# Patient Record
Sex: MALE | Race: WHITE | NOT HISPANIC OR LATINO | Employment: OTHER | URBAN - METROPOLITAN AREA
[De-identification: names, ages, dates, MRNs, and addresses within clinical notes are randomized per-mention and may not be internally consistent; named-entity substitution may affect disease eponyms.]

---

## 2017-01-25 ENCOUNTER — HOSPITAL ENCOUNTER (OUTPATIENT)
Dept: ONCOLOGY | Facility: CLINIC | Age: 62
Discharge: HOME | End: 2017-01-25
Attending: INTERNAL MEDICINE | Admitting: INTERNAL MEDICINE

## 2017-04-25 ENCOUNTER — HOSPITAL ENCOUNTER (OUTPATIENT)
Dept: ONCOLOGY | Facility: CLINIC | Age: 62
Discharge: HOME | End: 2017-04-25
Attending: NURSE PRACTITIONER | Admitting: NURSE PRACTITIONER

## 2017-04-25 LAB
ALBUMIN SERPL-MCNC: 3.9 G/DL (ref 3.5–4.8)
ALBUMIN/GLOB SERPL: 1.3 {RATIO} (ref 1–1.7)
ALP SERPL-CCNC: 89 IU/L (ref 32–91)
ALT SERPL-CCNC: 27 IU/L (ref 17–63)
ANION GAP SERPL CALC-SCNC: 15 MMOL/L (ref 10–20)
AST SERPL-CCNC: 25 IU/L (ref 15–41)
BILIRUB SERPL-MCNC: 0.7 MG/DL (ref 0.3–1.2)
BUN SERPL-MCNC: 17 MG/DL (ref 8–20)
BUN/CREAT SERPL: 17 (ref 6.2–20.3)
CALCIUM SERPL-MCNC: 9.5 MG/DL (ref 8.9–10.3)
CHLORIDE SERPL-SCNC: 105 MMOL/L (ref 101–111)
CONV CO2: 24 MMOL/L (ref 22–32)
CONV TOTAL PROTEIN: 6.9 G/DL (ref 6.1–7.9)
CREAT UR-MCNC: 1 MG/DL (ref 0.7–1.2)
GLOBULIN UR ELPH-MCNC: 3 G/DL (ref 2.5–3.8)
GLUCOSE SERPL-MCNC: 138 MG/DL (ref 65–99)
POTASSIUM SERPL-SCNC: 4 MMOL/L (ref 3.6–5.1)
SODIUM SERPL-SCNC: 140 MMOL/L (ref 136–144)

## 2017-05-19 ENCOUNTER — CONVERSION ENCOUNTER (OUTPATIENT)
Dept: RHEUMATOLOGY | Facility: CLINIC | Age: 62
End: 2017-05-19

## 2017-05-19 LAB
ALBUMIN SERPL-MCNC: 4.8 G/DL (ref 3.6–5.1)
ALBUMIN/GLOB SERPL: ABNORMAL {RATIO} (ref 1–2.5)
ALP SERPL-CCNC: 123 UNITS/L (ref 40–115)
ALT SERPL-CCNC: 19 UNITS/L (ref 9–46)
AST SERPL-CCNC: 21 UNITS/L (ref 10–35)
BILIRUB SERPL-MCNC: 0.8 MG/DL (ref 0.2–1.2)
BILIRUB UR QL STRIP: NEGATIVE
BUN SERPL-MCNC: 17 MG/DL (ref 7–25)
BUN/CREAT SERPL: ABNORMAL (ref 6–22)
CALCIUM SERPL-MCNC: 10.1 MG/DL (ref 8.6–10.3)
CHLORIDE SERPL-SCNC: 99 MMOL/L (ref 98–110)
CK SERPL-CCNC: 43 UNITS/L (ref 44–196)
CO2 CONTENT VENOUS: 30 MMOL/L (ref 20–31)
COLOR UR: YELLOW
CONV BACTERIA IN URINE MICRO: ABNORMAL /HPF
CONV HYALINE CASTS IN URINE MICRO: ABNORMAL
CONV PROTEIN IN URINE BY AUTOMATED TEST STRIP: NEGATIVE
CONV TOTAL PROTEIN: 7.8 G/DL (ref 6.1–8.1)
CREAT UR-MCNC: 0.95 MG/DL (ref 0.7–1.25)
CRP SERPL-MCNC: 0.87 MG/DL
CYCLIC CITRULLINATED PEPTIDE ANTIBODY: ABNORMAL
ERYTHROCYTE [DISTWIDTH] IN BLOOD BY AUTOMATED COUNT: 13.5 % (ref 11–15)
ERYTHROCYTE [SEDIMENTATION RATE] IN BLOOD BY WESTERGREN METHOD: 22 MM/HR
GLOBULIN UR ELPH-MCNC: ABNORMAL G/DL (ref 1.9–3.7)
GLUCOSE SERPL-MCNC: 102 MG/DL (ref 65–99)
GLUCOSE UR QL: NEGATIVE G/DL
HCT VFR BLD AUTO: 47.7 % (ref 38.5–50)
HGB BLD-MCNC: 16.2 G/DL (ref 13.2–17.1)
HGB UR QL STRIP: NEGATIVE
KETONES UR QL STRIP: NEGATIVE
LEUKOCYTE ESTERASE UR QL STRIP: NEGATIVE
MCH RBC QN AUTO: 31 PG (ref 27–33)
MCHC RBC AUTO-ENTMCNC: ABNORMAL % (ref 32–36)
MCV RBC AUTO: 91.2 FL (ref 80–100)
NITRITE UR QL STRIP: NEGATIVE
PH UR STRIP.AUTO: 5.5 [PH] (ref 5–8)
PLATELET # BLD AUTO: ABNORMAL 10*3/MM3 (ref 140–400)
PMV BLD AUTO: 10.6 FL (ref 7.5–12.5)
POTASSIUM SERPL-SCNC: 4.4 MMOL/L (ref 3.5–5.3)
RBC # BLD AUTO: ABNORMAL 10*6/MM3 (ref 4.2–5.8)
RBC #/AREA URNS HPF: ABNORMAL /[HPF]
SODIUM SERPL-SCNC: 141 MMOL/L (ref 135–146)
SP GR UR: 1.01 (ref 1–1.03)
SQUAMOUS #/AREA URNS HPF: ABNORMAL /HPF
TSH SERPL-ACNC: 2.97 MICROINTL UNITS/ML (ref 0.4–4.5)
WBC # BLD AUTO: ABNORMAL K/UL (ref 3.8–10.8)
WBC #/AREA URNS HPF: ABNORMAL CELLS/HPF

## 2017-10-24 ENCOUNTER — CLINICAL SUPPORT (OUTPATIENT)
Dept: ONCOLOGY | Facility: HOSPITAL | Age: 62
End: 2017-10-24

## 2017-10-24 ENCOUNTER — HOSPITAL ENCOUNTER (OUTPATIENT)
Dept: ONCOLOGY | Facility: CLINIC | Age: 62
Setting detail: INFUSION SERIES
Discharge: HOME | End: 2017-10-24
Attending: INTERNAL MEDICINE | Admitting: INTERNAL MEDICINE

## 2017-10-24 ENCOUNTER — HOSPITAL ENCOUNTER (OUTPATIENT)
Dept: ONCOLOGY | Facility: HOSPITAL | Age: 62
Discharge: HOME | End: 2017-10-24
Attending: INTERNAL MEDICINE | Admitting: INTERNAL MEDICINE

## 2017-10-24 NOTE — PROGRESS NOTES
PATIENTS ONCOLOGY RECORD LOCATED IN [facility]      Subjective     Name:  VALERIY DIAS     Date:  10/24/2017  Address:  [address] [place] [address] [address] [place] [address]  Home: [phone]  :  1955 AGE:  62 y.o.        RECORDS OBTAINED:  Patients Oncology Record is located in [facility]

## 2017-11-03 ENCOUNTER — CLINICAL SUPPORT (OUTPATIENT)
Dept: ONCOLOGY | Facility: HOSPITAL | Age: 62
End: 2017-11-03

## 2017-11-03 ENCOUNTER — HOSPITAL ENCOUNTER (OUTPATIENT)
Dept: ONCOLOGY | Facility: CLINIC | Age: 62
Setting detail: INFUSION SERIES
Discharge: HOME | End: 2017-11-03
Attending: INTERNAL MEDICINE | Admitting: INTERNAL MEDICINE

## 2017-11-03 ENCOUNTER — HOSPITAL ENCOUNTER (OUTPATIENT)
Dept: ONCOLOGY | Facility: HOSPITAL | Age: 62
Discharge: HOME | End: 2017-11-03
Attending: INTERNAL MEDICINE | Admitting: INTERNAL MEDICINE

## 2017-11-03 NOTE — PROGRESS NOTES
PATIENTS ONCOLOGY RECORD LOCATED IN [facility]      Subjective     Name:  VALERIY DIAS     Date:  2017  Address:  [address] [place] [address] [address] [place] [id number]  Home: [phone]  :  1955 AGE:  62 y.o.        RECORDS OBTAINED:  Patients Oncology Record is located in [facility]

## 2017-11-16 ENCOUNTER — CLINICAL SUPPORT (OUTPATIENT)
Dept: ONCOLOGY | Facility: HOSPITAL | Age: 62
End: 2017-11-16

## 2017-11-16 ENCOUNTER — HOSPITAL ENCOUNTER (OUTPATIENT)
Dept: ONCOLOGY | Facility: CLINIC | Age: 62
Setting detail: INFUSION SERIES
Discharge: HOME | End: 2017-11-16
Attending: INTERNAL MEDICINE | Admitting: INTERNAL MEDICINE

## 2017-11-16 ENCOUNTER — HOSPITAL ENCOUNTER (OUTPATIENT)
Dept: ONCOLOGY | Facility: HOSPITAL | Age: 62
Discharge: HOME | End: 2017-11-16
Attending: INTERNAL MEDICINE | Admitting: INTERNAL MEDICINE

## 2017-11-16 NOTE — PROGRESS NOTES
PATIENTS ONCOLOGY RECORD LOCATED IN [facility]      Subjective     Name:  VALERIY DIAS     Date:  2017  Address:  [address] [place] [address] [address] [place] [address]  Home: [phone]  :  1955 AGE:  62 y.o.        RECORDS OBTAINED:  Patients Oncology Record is located in [facility]

## 2018-03-29 ENCOUNTER — HOSPITAL ENCOUNTER (OUTPATIENT)
Dept: RHEUMATOLOGY | Facility: CLINIC | Age: 63
Discharge: HOME | End: 2018-03-29
Attending: INTERNAL MEDICINE | Admitting: INTERNAL MEDICINE

## 2018-08-27 ENCOUNTER — HOSPITAL ENCOUNTER (OUTPATIENT)
Dept: LAB | Facility: HOSPITAL | Age: 63
Discharge: HOME | End: 2018-08-27
Attending: INTERNAL MEDICINE | Admitting: INTERNAL MEDICINE

## 2018-08-27 LAB
BILIRUB UR QL STRIP: NEGATIVE MG/DL
CASTS URNS QL MICRO: ABNORMAL /[LPF]
COLOR UR: YELLOW
CONV BACTERIA IN URINE MICRO: NEGATIVE
CONV CLARITY OF URINE: ABNORMAL
CONV HYALINE CASTS IN URINE MICRO: ABNORMAL /[LPF] (ref 0–5)
CONV PROTEIN IN URINE BY AUTOMATED TEST STRIP: NEGATIVE MG/DL
CONV SMALL ROUND CELLS: ABNORMAL /[HPF]
CONV UROBILINOGEN IN URINE BY AUTOMATED TEST STRIP: 0.2 MG/DL
CRP SERPL-MCNC: 0.37 MG/DL (ref 0–0.7)
CULTURE INDICATED?: ABNORMAL
ERYTHROCYTE [SEDIMENTATION RATE] IN BLOOD BY WESTERGREN METHOD: 17 MM/HR (ref 0–20)
GLUCOSE UR QL: NEGATIVE MG/DL
HGB UR QL STRIP: NEGATIVE
KETONES UR QL STRIP: NEGATIVE MG/DL
LEUKOCYTE ESTERASE UR QL STRIP: NEGATIVE
NITRITE UR QL STRIP: NEGATIVE
PH UR STRIP.AUTO: 5.5 [PH] (ref 4.5–8)
RBC #/AREA URNS HPF: 1 /[HPF] (ref 0–3)
SP GR UR: 1.02 (ref 1–1.03)
SPERM URNS QL MICRO: ABNORMAL /[HPF]
SQUAMOUS SPT QL MICRO: 1 /[HPF] (ref 0–5)
UNIDENT CRYS URNS QL MICRO: ABNORMAL /[HPF]
WBC #/AREA URNS HPF: 1 /[HPF] (ref 0–5)
YEAST SPEC QL WET PREP: ABNORMAL /[HPF]

## 2018-08-29 LAB
ANA SER QL IA: NORMAL
C3 SERPL-MCNC: 124 MG/DL (ref 79–152)
C4 SERPL-MCNC: 24 MG/DL (ref 18–55)

## 2018-10-02 ENCOUNTER — HOSPITAL ENCOUNTER (OUTPATIENT)
Dept: ONCOLOGY | Facility: CLINIC | Age: 63
Setting detail: INFUSION SERIES
Discharge: HOME | End: 2018-10-02
Attending: NURSE PRACTITIONER | Admitting: NURSE PRACTITIONER

## 2018-10-02 ENCOUNTER — CLINICAL SUPPORT (OUTPATIENT)
Dept: ONCOLOGY | Facility: HOSPITAL | Age: 63
End: 2018-10-02

## 2018-10-02 NOTE — PROGRESS NOTES
PATIENTS ONCOLOGY RECORD LOCATED IN [facility]      Subjective     Name:  VALERIY DIAS     Date:  10/02/2018  Address:  [address] [place] [address] [address] [place] [address]  Home: [phone]  :  1955 AGE:  63 y.o.        RECORDS OBTAINED:  Patients Oncology Record is located in [facility]

## 2019-03-18 ENCOUNTER — HOSPITAL ENCOUNTER (OUTPATIENT)
Dept: RHEUMATOLOGY | Facility: CLINIC | Age: 64
Discharge: HOME | End: 2019-03-18
Attending: INTERNAL MEDICINE | Admitting: INTERNAL MEDICINE

## 2019-04-02 ENCOUNTER — HOSPITAL ENCOUNTER (OUTPATIENT)
Dept: ONCOLOGY | Facility: CLINIC | Age: 64
Setting detail: INFUSION SERIES
End: 2019-04-02
Attending: INTERNAL MEDICINE | Admitting: INTERNAL MEDICINE

## 2019-07-09 ENCOUNTER — TELEPHONE (OUTPATIENT)
Dept: ONCOLOGY | Facility: CLINIC | Age: 64
End: 2019-07-09

## 2019-07-09 NOTE — TELEPHONE ENCOUNTER
Patient called 7/8 and left message about rescheduling appt with Dr. Sewell.  Returned his call on 7/9 and scheduled a follow up appointment for 7/31.

## 2019-07-15 ENCOUNTER — OFFICE VISIT (OUTPATIENT)
Dept: RHEUMATOLOGY | Facility: CLINIC | Age: 64
End: 2019-07-15

## 2019-07-15 VITALS
SYSTOLIC BLOOD PRESSURE: 159 MMHG | BODY MASS INDEX: 38.36 KG/M2 | WEIGHT: 315 LBS | HEIGHT: 76 IN | HEART RATE: 66 BPM | DIASTOLIC BLOOD PRESSURE: 83 MMHG

## 2019-07-15 DIAGNOSIS — E03.9 ACQUIRED HYPOTHYROIDISM: ICD-10-CM

## 2019-07-15 DIAGNOSIS — M06.4 UNDIFFERENTIATED INFLAMMATORY POLYARTHRITIS (HCC): ICD-10-CM

## 2019-07-15 DIAGNOSIS — D69.6 THROMBOCYTOPENIA (HCC): ICD-10-CM

## 2019-07-15 DIAGNOSIS — M54.50 CHRONIC BILATERAL LOW BACK PAIN WITHOUT SCIATICA: ICD-10-CM

## 2019-07-15 DIAGNOSIS — E55.9 VITAMIN D DEFICIENCY: Primary | ICD-10-CM

## 2019-07-15 DIAGNOSIS — G89.29 CHRONIC BILATERAL LOW BACK PAIN WITHOUT SCIATICA: ICD-10-CM

## 2019-07-15 PROBLEM — M25.512 PAIN IN JOINT OF LEFT SHOULDER: Status: ACTIVE | Noted: 2017-05-17

## 2019-07-15 PROBLEM — M19.90 INFLAMMATORY ARTHRITIS: Status: ACTIVE | Noted: 2017-05-17

## 2019-07-15 PROCEDURE — 99213 OFFICE O/P EST LOW 20 MIN: CPT | Performed by: INTERNAL MEDICINE

## 2019-07-15 RX ORDER — ALBUTEROL SULFATE 90 UG/1
AEROSOL, METERED RESPIRATORY (INHALATION) AS NEEDED
COMMUNITY

## 2019-07-15 RX ORDER — HYDROCODONE BITARTRATE AND ACETAMINOPHEN 10; 325 MG/1; MG/1
TABLET ORAL AS NEEDED
COMMUNITY

## 2019-07-15 RX ORDER — CYCLOBENZAPRINE HCL 10 MG
1 TABLET ORAL 2 TIMES DAILY
COMMUNITY
Start: 2017-05-17

## 2019-07-15 RX ORDER — HYDROXYCHLOROQUINE SULFATE 200 MG/1
200 TABLET, FILM COATED ORAL 2 TIMES DAILY
Qty: 180 TABLET | Refills: 1 | Status: SHIPPED | OUTPATIENT
Start: 2019-07-15

## 2019-07-15 RX ORDER — SERTRALINE HYDROCHLORIDE 100 MG/1
1.5 TABLET, FILM COATED ORAL NIGHTLY
COMMUNITY
Start: 2019-05-30

## 2019-07-15 RX ORDER — LEVOTHYROXINE SODIUM 100 MCG
1 TABLET ORAL DAILY
COMMUNITY
Start: 2019-06-27

## 2019-07-15 RX ORDER — HYDROCHLOROTHIAZIDE 25 MG/1
1 TABLET ORAL DAILY
COMMUNITY
Start: 2019-05-30

## 2019-07-15 RX ORDER — ASPIRIN 81 MG/1
1 TABLET ORAL DAILY
COMMUNITY
End: 2022-01-10 | Stop reason: HOSPADM

## 2019-07-15 RX ORDER — FLUTICASONE PROPIONATE 50 MCG
SPRAY, SUSPENSION (ML) NASAL DAILY
COMMUNITY
Start: 2017-05-17

## 2019-07-15 RX ORDER — CLOTRIMAZOLE AND BETAMETHASONE DIPROPIONATE 10; .64 MG/G; MG/G
CREAM TOPICAL AS NEEDED
COMMUNITY

## 2019-07-15 RX ORDER — GABAPENTIN 300 MG/1
300 CAPSULE ORAL
Qty: 90 CAPSULE | Refills: 0 | Status: SHIPPED | OUTPATIENT
Start: 2019-07-15

## 2019-07-15 RX ORDER — PRAVASTATIN SODIUM 40 MG
1 TABLET ORAL NIGHTLY
COMMUNITY
Start: 2019-05-28

## 2019-07-15 RX ORDER — CLONIDINE HYDROCHLORIDE 0.2 MG/1
1 TABLET ORAL 2 TIMES DAILY
COMMUNITY

## 2019-07-15 RX ORDER — GABAPENTIN 300 MG/1
1 CAPSULE ORAL
COMMUNITY
Start: 2019-07-05 | End: 2019-07-15 | Stop reason: SDUPTHER

## 2019-07-15 RX ORDER — ALPRAZOLAM 1 MG/1
1 TABLET ORAL 4 TIMES DAILY
COMMUNITY
Start: 2019-07-08

## 2019-07-15 RX ORDER — HYDROXYCHLOROQUINE SULFATE 200 MG/1
1 TABLET, FILM COATED ORAL 2 TIMES DAILY
COMMUNITY
Start: 2017-05-17 | End: 2019-07-15 | Stop reason: SDUPTHER

## 2019-07-15 RX ORDER — AMLODIPINE BESYLATE AND BENAZEPRIL HYDROCHLORIDE 5; 40 MG/1; MG/1
1 CAPSULE ORAL DAILY
COMMUNITY

## 2019-07-15 RX ORDER — METOPROLOL TARTRATE 50 MG/1
1 TABLET, FILM COATED ORAL DAILY
COMMUNITY
Start: 2018-12-08

## 2019-07-15 NOTE — PROGRESS NOTES
Subjective   Chief Complaint   Patient presents with   • Follow-up     c/o pain in back,shoulders,and hands        Nasra Torres is a 63 y.o. male.     History of Present Illness    Is a 63 years old very overnourished white male with history of the arthralgia arthritis diabetes and also thyroid problem and Sjogren's syndrome he currently take Plaquenil 2 a day and Neurontin and vitamin D and 100 mcg Synthroid.  He is doing fairly good he does not have any significant problem occasionally have pains and aches in the lower back bothering him but not significant and he take his medication and do the eye exam regularly and use artificial tears and his eyes.  No fever no chills.  Had blood work done recently which was okay.  Overall he is doing reasonably well and managed to do most of his daily activity and he understand that he need to lose weight and get better control of the blood sugar.    The following portions of the patient's history were reviewed and updated as appropriate: allergies, current medications, past family history, past medical history, past social history, past surgical history and problem list.    Past Medical History:   Diagnosis Date   • Anxiety    • Borderline diabetes    • COPD (chronic obstructive pulmonary disease) (CMS/[facility])     environmental   • Depression    • Dry eyes    • Hyperlipidemia    • Hypertension    • Hypothyroidism       Past Surgical History:   Procedure Laterality Date   • CHOLECYSTECTOMY      and post hernia repair   • HERNIA REPAIR      ventral hernia - age 3   • HERNIA REPAIR Bilateral     inguinal - reopen later for E - Coli     Family History   Problem Relation Age of Onset   • Heart disease Mother         CABG   • Hypertension Mother    • Heart disease Sister         LEAKY VALVE   • Heart disease Maternal Grandmother    • Hypertension Maternal Grandmother    • Other Maternal Grandmother 62        massive MI      Social History     Socioeconomic History   • Marital status:       Spouse name: Billie Torres   • Number of children: 2   • Years of education: Not on file   • Highest education level: Not on file   Tobacco Use   • Smoking status: Never Smoker   • Smokeless tobacco: Never Used   Substance and Sexual Activity   • Alcohol use: No     Frequency: Never   • Drug use: No     Allergies   Allergen Reactions   • Sulfa Antibiotics Hives and Swelling     Eye swelling   • Tetanus Antitoxin Nausea Only        Current Outpatient Medications:   •  albuterol sulfate HFA (PROAIR HFA) 108 (90 Base) MCG/ACT inhaler, As Needed., Disp: , Rfl:   •  ALPRAZolam (XANAX) 1 MG tablet, Take 1 tablet by mouth 4 (Four) Times a Day., Disp: , Rfl:   •  Alum Hydroxide-Mag Trisilicate 80-14.2 MG chewable tablet, GAVISCON CHEW, Disp: , Rfl:   •  amLODIPine-benazepril (LOTREL) 5-40 MG per capsule, Take 1 capsule by mouth Daily., Disp: , Rfl:   •  aspirin (ASPIR-LOW) 81 MG EC tablet, Take 1 tablet by mouth Daily., Disp: , Rfl:   •  Cholecalciferol (VITAMIN D3) 3000 units tablet, Take 1 tablet by mouth Daily., Disp: , Rfl:   •  CloNIDine (CATAPRES) 0.2 MG tablet, Take 1 tablet by mouth 2 (Two) Times a Day., Disp: , Rfl:   •  clotrimazole-betamethasone (LOTRISONE) 1-0.05 % cream, As Needed., Disp: , Rfl:   •  cyclobenzaprine (FLEXERIL) 10 MG tablet, Take 1 tablet by mouth 2 (Two) Times a Day., Disp: , Rfl:   •  fluticasone (FLONASE) 50 MCG/ACT nasal spray, Daily., Disp: , Rfl:   •  gabapentin (NEURONTIN) 300 MG capsule, Take 1 capsule by mouth every night at bedtime., Disp: 90 capsule, Rfl: 0  •  hydrochlorothiazide (HYDRODIURIL) 25 MG tablet, Take 1 tablet by mouth Daily., Disp: , Rfl:   •  HYDROcodone-acetaminophen (NORCO)  MG per tablet, As Needed., Disp: , Rfl:   •  hydroxychloroquine (PLAQUENIL) 200 MG tablet, Take 1 tablet by mouth 2 (Two) Times a Day., Disp: 180 tablet, Rfl: 1  •  metoprolol tartrate (LOPRESSOR) 50 MG tablet, Take 1 tablet by mouth Daily., Disp: , Rfl:   •  pravastatin  (PRAVACHOL) 40 MG tablet, Take 1 tablet by mouth Daily., Disp: , Rfl:   •  sertraline (ZOLOFT) 100 MG tablet, Take 1.5 tablets by mouth Daily. 1 AND 1/2 TAB DAILY, Disp: , Rfl:   •  SYNTHROID 100 MCG tablet, Take 1 tablet by mouth Daily., Disp: , Rfl:      Review of System    Appetite is okay does not have any significant GI  or respiratory problem no chest pain no significant shortness of the breath no significant ears nose throat problem rest of the review of the systems unremarkable except for diabetes and thyroid problem and dryness in the eyes otherwise unremarkable.    Objective   Physical Exam    Very overnourished white male alert orientated cooperative does not appear in significant distress his gait and postures are fairly normal.  HEENT is unremarkable.  Chest is clear to auscultation percussion.  Cardiovascular normal.  Neurologic exam deep tendon reflexes are sluggish in upper and lower extremities.  Joint examination he has a slight decreased range of motion of the spine and the hips there is no significant tenderness in any joints minimal tenderness over the lumbar spine.  He also has tenderness over the base of the both thumb and couple of the DIP joints there is no significant swelling in any joints.  Muscle exam he has fairly normal muscle strengths.  Skin there is no significant rash no subcutaneous notes    Assessment/Plan   Problem List Items Addressed This Visit        Digestive    Vitamin D deficiency - Primary       Endocrine    Hypothyroidism    Relevant Medications    metoprolol tartrate (LOPRESSOR) 50 MG tablet    SYNTHROID 100 MCG tablet       Nervous and Auditory    Lower back pain       Musculoskeletal and Integument    Undifferentiated inflammatory polyarthritis (CMS/HCC)    Relevant Medications    hydroxychloroquine (PLAQUENIL) 200 MG tablet    gabapentin (NEURONTIN) 300 MG capsule       Hematopoietic and Hemostatic    Thrombocytopenia (CMS/HCC)    Relevant Medications     hydroxychloroquine (PLAQUENIL) 200 MG tablet    gabapentin (NEURONTIN) 300 MG capsule          I suggested that need to continue with the current medication lose weight good control of the blood sugars and continue with her thyroid medications and use artificial tears for his eyes and will be glad to see him again in another 6 months  Patient Instructions   Exercising to Lose Weight  Exercising can help you to lose weight. In order to lose weight through exercise, you need to do vigorous-intensity exercise. You can tell that you are exercising with vigorous intensity if you are breathing very hard and fast and cannot hold a conversation while exercising.  Moderate-intensity exercise helps to maintain your current weight. You can tell that you are exercising at a moderate level if you have a higher heart rate and faster breathing, but you are still able to hold a conversation.  How often should I exercise?  Choose an activity that you enjoy and set realistic goals. Your health care provider can help you to make an activity plan that works for you. Exercise regularly as directed by your health care provider. This may include:  · Doing resistance training twice each week, such as:  ? Push-ups.  ? Sit-ups.  ? Lifting weights.  ? Using resistance bands.  · Doing a given intensity of exercise for a given amount of time. Choose from these options:  ? 150 minutes of moderate-intensity exercise every week.  ? 75 minutes of vigorous-intensity exercise every week.  ? A mix of moderate-intensity and vigorous-intensity exercise every week.    Children, pregnant women, people who are out of shape, people who are overweight, and older adults may need to consult a health care provider for individual recommendations. If you have any sort of medical condition, be sure to consult your health care provider before starting a new exercise program.  What are some activities that can help me to lose weight?  · Walking at a rate of at least  4.5 miles an hour.  · Jogging or running at a rate of 5 miles per hour.  · Biking at a rate of at least 10 miles per hour.  · Lap swimming.  · Roller-skating or in-line skating.  · Cross-country skiing.  · Vigorous competitive sports, such as football, basketball, and soccer.  · Jumping rope.  · Aerobic dancing.  How can I be more active in my day-to-day activities?  · Use the stairs instead of the elevator.  · Take a walk during your lunch break.  · If you drive, park your car farther away from work or school.  · If you take public transportation, get off one stop early and walk the rest of the way.  · Make all of your phone calls while standing up and walking around.  · Get up, stretch, and walk around every 30 minutes throughout the day.  What guidelines should I follow while exercising?  · Do not exercise so much that you hurt yourself, feel dizzy, or get very short of breath.  · Consult your health care provider prior to starting a new exercise program.  · Wear comfortable clothes and shoes with good support.  · Drink plenty of water while you exercise to prevent dehydration or heat stroke. Body water is lost during exercise and must be replaced.  · Work out until you breathe faster and your heart beats faster.  This information is not intended to replace advice given to you by your health care provider. Make sure you discuss any questions you have with your health care provider.  Document Released: 01/20/2012 Document Revised: 05/25/2017 Document Reviewed: 05/21/2015  [employer] Interactive Patient Education © 2019 [employer]

## 2019-07-15 NOTE — PATIENT INSTRUCTIONS

## 2019-07-31 ENCOUNTER — OFFICE VISIT (OUTPATIENT)
Dept: ONCOLOGY | Facility: CLINIC | Age: 64
End: 2019-07-31

## 2019-07-31 ENCOUNTER — APPOINTMENT (OUTPATIENT)
Dept: LAB | Facility: HOSPITAL | Age: 64
End: 2019-07-31

## 2019-07-31 VITALS
SYSTOLIC BLOOD PRESSURE: 158 MMHG | RESPIRATION RATE: 18 BRPM | DIASTOLIC BLOOD PRESSURE: 89 MMHG | WEIGHT: 315 LBS | BODY MASS INDEX: 38.36 KG/M2 | HEIGHT: 76 IN | HEART RATE: 90 BPM | TEMPERATURE: 97.9 F

## 2019-07-31 DIAGNOSIS — D69.6 THROMBOCYTOPENIA (HCC): Primary | ICD-10-CM

## 2019-07-31 DIAGNOSIS — M06.4 UNDIFFERENTIATED INFLAMMATORY POLYARTHRITIS (HCC): ICD-10-CM

## 2019-07-31 PROBLEM — R76.8 ANA POSITIVE: Status: ACTIVE | Noted: 2019-07-31

## 2019-07-31 PROBLEM — M35.00 SJOGREN'S SYNDROME (HCC): Status: ACTIVE | Noted: 2019-07-31

## 2019-07-31 LAB
BASOPHILS # BLD AUTO: 0.01 10*3/MM3 (ref 0–0.2)
BASOPHILS NFR BLD AUTO: 0.2 % (ref 0–1.5)
DEPRECATED RDW RBC AUTO: 43 FL (ref 37–54)
EOSINOPHIL # BLD AUTO: 0.25 10*3/MM3 (ref 0–0.4)
EOSINOPHIL NFR BLD AUTO: 4.5 % (ref 0.3–6.2)
ERYTHROCYTE [DISTWIDTH] IN BLOOD BY AUTOMATED COUNT: 13.4 % (ref 12.3–15.4)
HCT VFR BLD AUTO: 41.9 % (ref 37.5–51)
HGB BLD-MCNC: 14.4 G/DL (ref 13–17.7)
LYMPHOCYTES # BLD AUTO: 0.8 10*3/MM3 (ref 0.7–3.1)
LYMPHOCYTES NFR BLD AUTO: 14.3 % (ref 19.6–45.3)
MCH RBC QN AUTO: 30.4 PG (ref 26.6–33)
MCHC RBC AUTO-ENTMCNC: 34.4 G/DL (ref 31.5–35.7)
MCV RBC AUTO: 88.6 FL (ref 79–97)
MONOCYTES # BLD AUTO: 0.53 10*3/MM3 (ref 0.1–0.9)
MONOCYTES NFR BLD AUTO: 9.5 % (ref 5–12)
NEUTROPHILS # BLD AUTO: 4.01 10*3/MM3 (ref 1.7–7)
NEUTROPHILS NFR BLD AUTO: 71.5 % (ref 42.7–76)
PLATELET # BLD AUTO: 114 10*3/MM3 (ref 140–450)
PMV BLD AUTO: 9.4 FL (ref 6–12)
RBC # BLD AUTO: 4.73 10*6/MM3 (ref 4.14–5.8)
WBC NRBC COR # BLD: 5.6 10*3/MM3 (ref 3.4–10.8)

## 2019-07-31 PROCEDURE — 36415 COLL VENOUS BLD VENIPUNCTURE: CPT | Performed by: INTERNAL MEDICINE

## 2019-07-31 PROCEDURE — 99213 OFFICE O/P EST LOW 20 MIN: CPT | Performed by: INTERNAL MEDICINE

## 2019-07-31 PROCEDURE — 85025 COMPLETE CBC W/AUTO DIFF WBC: CPT | Performed by: INTERNAL MEDICINE

## 2019-07-31 NOTE — PROGRESS NOTES
Hematology/Oncology Outpatient Follow Up    PATIENT NAME:Nasra Torres  :1955  MRN: [id number]  PRIMARY CARE PHYSICIAN: Lisa Estrada  REFERRING PHYSICIAN: Lisa Estrada    Chief Complaint   Patient presents with   • Follow-up     for thrombocytopenia         HISTORY OF PRESENT ILLNESS:   1. Thrombocytopenia likely secondary to platelet clumping diagnosed 2015.  • He was found on a routine physical by Lisa Brewer PA-C was found to have platelet clumping on a CBC ordered on 8/13/15. His WBC was 4500 with 72% neutrophils and 13% lymphocytes, hemoglobin 14.2 and platelet count could not be counted because of platelet clumps.  A comprehensive metabolic panel had no significant abnormalities with serum creatinine 0.83 and normal transaminases. A CBC was repeated on 8/31/15 revealing WBC 5.1, hemoglobin 14.8 and platelet count could not be counted again because of platelet clumps. A CBC was then ordered in citrated blood performed on 8/31/15 and reported to be at 96,000, verified by examination of peripheral blood smear. The patient was then referred here for further evaluation and claims to have had a CBC done at the time of his inguinal hernia surgery in  at [facility]. His records from [facility] on CBC performed on 14 reveal a platelet count of 212,000. Patient currently denies easy bruising, nosebleeds, gum bleeds or blood in the stool. He claims to have had hematuria in  which was worked up by his urologist, Dr. Longo, who did a cystoscopy and no reason was found.   • 9/28/15 - [facility] on referral from Lisa Sands PA-C. WBC was 6.2, hemoglobin 15.3 and platelet count 156,000. CBC was repeated in a blue top tube revealing WBC 5.8, hemoglobin 13.5 and platelet count 155,000. PT PTT were 10.2 and 24 seconds. CARMEL was positive with LOTUS-1, IgG positive at 301 (<120)  relation myositis. Vitamin B12 level was 497 (180-914), folate 12.6 (5.9-24.8), platelet antibodies were negative. EBV IgM was < 0.2 (<0.9) and EBV IgG >8 (<0.9). CMV IgM was 0.43 (<0.91) and CMV IgG 7.7 (<0.9).   • 10/22/15 - WBC 5.8, hemoglobin 14.7, platelet count 139,000. Comprehensive metabolic panel with no significant abnormality. Total CK 74 (), albumin is 7.8 (<8.1).   • 2/23/16 - WBC 5.5, hemoglobin 14.9, platelet count 137,000. Discussed diagnosis of EDTA-induced cytopenia versus ITP. As patient lives in [place] and is having blood checked by Dr. Dubois every three months, decision made to check it over here every six months as patient claims that they frequently have problems getting a platelet count on him. He had, however, canceled his last appointment due to the distance of travel.   • 4/19/16 - Platelet count checked at Dr. Dubois’s office was 87,000. Comprehensive metabolic panel with no significant abnormality.   • 10/25/16 - WBC 5.2, hemoglobin 13.5, platelet count 112,000 in a blue top tube-all CBC’s to be performed in a blue top. CARMEL screen positive for possible myositis with a level of 495. Platelet antibody screen negative.   • 4/25/17 - Platelet count 66,000, on repeat in blue top 135,000.   • Medication review for thrombocytopenia. Hydrocodone-acetaminophen, clonidine, aspirin, and Neurontin list it as a rare (less that 0.1%) side effect. Aleve has it listed as very rare( 0.01%-0.1%) side effect.  Keflex, Hydroxychloroquine and Zoloft had it as a side effect with the frequency unreported. Lotrel and Bystolic had it as a side effect from post marketing reports.   • 11/3/17 - Bone marrow aspiration with aspicular aspirate and clot section. Flow cytometry had no abnormal cell population identified. Cytogenetics revealed 44 XY male karyotype.    • 8/16/18 - CMP remarkable for glucose 107. Hepatitis C antibody negative. CARMEL screen with reflex negative. ESR 17 (N). Complement 3 of 124  (N), complement 4 of 24 (N). CRP 0.37 (N). Vitamin D 33 (N).    • 9/15/18 - Received pneumonia, flu and hepatitis A vaccines.   • 1/18/19 - Chest xray showed left lower lobe opacity. Pneumonia versus atelectasis. No other evidence of acute disease.   • 3/18/19 - x-ray bilateral hands showed no acute fracture dislocation seen within either hand.  • 10/16/18 colonoscopy by Clyde Dubois MD revealed diverticulosis of the sigmoid colon with repeat colonoscopy recommended in 10 years.  • 4/15/19 - DEXA scan was normal.     Past Medical History:   Diagnosis Date   • Anxiety    • Borderline diabetes    • COPD (chronic obstructive pulmonary disease) (CMS/HCC)     environmental   • Depression    • Dry eyes    • Hyperlipidemia    • Hypertension    • Hypothyroidism        Past Surgical History:   Procedure Laterality Date   • CHOLECYSTECTOMY      and post hernia repair   • HERNIA REPAIR      ventral hernia - age 3   • HERNIA REPAIR Bilateral     inguinal - reopen later for E - Coli         Current Outpatient Medications:   •  albuterol sulfate HFA (PROAIR HFA) 108 (90 Base) MCG/ACT inhaler, As Needed., Disp: , Rfl:   •  ALPRAZolam (XANAX) 1 MG tablet, Take 1 tablet by mouth 4 (Four) Times a Day., Disp: , Rfl:   •  Alum Hydroxide-Mag Trisilicate 80-14.2 MG chewable tablet, GAVISCON CHEW, Disp: , Rfl:   •  amLODIPine-benazepril (LOTREL) 5-40 MG per capsule, Take 1 capsule by mouth Daily., Disp: , Rfl:   •  aspirin (ASPIR-LOW) 81 MG EC tablet, Take 1 tablet by mouth Daily., Disp: , Rfl:   •  Cholecalciferol (VITAMIN D3) 3000 units tablet, Take 1 tablet by mouth Daily., Disp: , Rfl:   •  CloNIDine (CATAPRES) 0.2 MG tablet, Take 1 tablet by mouth 2 (Two) Times a Day., Disp: , Rfl:   •  clotrimazole-betamethasone (LOTRISONE) 1-0.05 % cream, As Needed., Disp: , Rfl:   •  cyclobenzaprine (FLEXERIL) 10 MG tablet, Take 1 tablet by mouth 2 (Two) Times a Day., Disp: , Rfl:   •  fluticasone (FLONASE) 50 MCG/ACT nasal spray, Daily., Disp:  , Rfl:   •  gabapentin (NEURONTIN) 300 MG capsule, Take 1 capsule by mouth every night at bedtime., Disp: 90 capsule, Rfl: 0  •  hydrochlorothiazide (HYDRODIURIL) 25 MG tablet, Take 1 tablet by mouth Daily., Disp: , Rfl:   •  HYDROcodone-acetaminophen (NORCO)  MG per tablet, As Needed., Disp: , Rfl:   •  hydroxychloroquine (PLAQUENIL) 200 MG tablet, Take 1 tablet by mouth 2 (Two) Times a Day., Disp: 180 tablet, Rfl: 1  •  metoprolol tartrate (LOPRESSOR) 50 MG tablet, Take 1 tablet by mouth Daily., Disp: , Rfl:   •  pravastatin (PRAVACHOL) 40 MG tablet, Take 1 tablet by mouth Daily., Disp: , Rfl:   •  sertraline (ZOLOFT) 100 MG tablet, Take 1.5 tablets by mouth Daily. 1 AND 1/2 TAB DAILY, Disp: , Rfl:   •  SYNTHROID 100 MCG tablet, Take 1 tablet by mouth Daily., Disp: , Rfl:     Allergies   Allergen Reactions   • Sulfa Antibiotics Hives and Swelling     Eye swelling   • Tetanus Antitoxin Nausea Only       Family History   Problem Relation Age of Onset   • Heart disease Mother         CABG   • Hypertension Mother    • Heart disease Sister         LEAKY VALVE   • Heart disease Maternal Grandmother    • Hypertension Maternal Grandmother    • Other Maternal Grandmother 62        massive MI       Cancer-related family history is not on file.    Social History     Tobacco Use   • Smoking status: Never Smoker   • Smokeless tobacco: Never Used   Substance Use Topics   • Alcohol use: No     Frequency: Never   • Drug use: No       I have reviewed the history of present illness, past medical history, family history, social history, lab results, all notes and other records since the patient was last seen on 10/2/18.    SUBJECTIVE: Patient is here to follow up of thrombocytopenia. Reports that Dr. Elias has diagnosed him with lupus and is on treatment. Has arthritis pain and has for years. Over exertion or sitting too long. Movement and meds help. Pain is sharp, dull and achy. Pain is 5/10. Denies any bleeding. Has some  "allergies, anxiety and problems sleeping.     Female accompanied patient today.   Caroline SalazarSUZE present during office visit.          REVIEW OF SYSTEMS:  Review of Systems   Constitutional: Negative for chills and fever.   HENT: Negative for ear pain, mouth sores, nosebleeds and sore throat.    Eyes: Negative for photophobia and visual disturbance.   Respiratory: Negative for wheezing and stridor.    Cardiovascular: Negative for chest pain and palpitations.   Gastrointestinal: Negative for abdominal pain, diarrhea, nausea and vomiting.   Endocrine: Negative for cold intolerance and heat intolerance.   Genitourinary: Negative for dysuria and hematuria.   Musculoskeletal: Negative for joint swelling and neck stiffness.   Skin: Negative for color change and rash.   Neurological: Negative for seizures and syncope.   Hematological: Negative for adenopathy.        No obvious bleeding   Psychiatric/Behavioral: Negative for agitation, confusion and hallucinations.       OBJECTIVE:    Vitals:    07/31/19 1132   BP: 158/89   Pulse: 90   Resp: 18   Temp: 97.9 °F (36.6 °C)   Weight: (!) 177 kg (390 lb)   Height: 193 cm (76\")   PainSc: 0-No pain       ECOG  (1) Restricted in physically strenuous activity, ambulatory and able to do work of light nature    Physical Exam   Constitutional: He is oriented to person, place, and time. No distress.   HENT:   Head: Normocephalic and atraumatic.   Dental fillings.    Eyes: Conjunctivae and EOM are normal. Right eye exhibits no discharge. Left eye exhibits no discharge. No scleral icterus.   Eyeglasses present.    Neck: Normal range of motion. Neck supple. No thyromegaly present.   Cardiovascular: Normal rate, regular rhythm and normal heart sounds. Exam reveals no gallop and no friction rub.   Pulmonary/Chest: Effort normal. No stridor. No respiratory distress. He has no wheezes.   Abdominal: Soft. Bowel sounds are normal. He exhibits no mass. There is no tenderness. There is no " rebound and no guarding.   Obese.    Musculoskeletal: Normal range of motion. He exhibits no tenderness.   Lymphadenopathy:     He has no cervical adenopathy.   Neurological: He is alert and oriented to person, place, and time. He exhibits normal muscle tone.   Skin: Skin is warm. No rash noted. He is not diaphoretic. No erythema.   Psychiatric: He has a normal mood and affect. His behavior is normal.   Nursing note and vitals reviewed.      RECENT LABS  WBC   Date Value Ref Range Status   07/31/2019 5.60 3.40 - 10.80 10*3/mm3 Final     RBC   Date Value Ref Range Status   07/31/2019 4.73 4.14 - 5.80 10*6/mm3 Final     Hemoglobin   Date Value Ref Range Status   07/31/2019 14.4 13.0 - 17.7 g/dL Final     Hematocrit   Date Value Ref Range Status   07/31/2019 41.9 37.5 - 51.0 % Final     MCV   Date Value Ref Range Status   07/31/2019 88.6 79.0 - 97.0 fL Final     MCH   Date Value Ref Range Status   07/31/2019 30.4 26.6 - 33.0 pg Final     MCHC   Date Value Ref Range Status   07/31/2019 34.4 31.5 - 35.7 g/dL Final     RDW   Date Value Ref Range Status   07/31/2019 13.4 12.3 - 15.4 % Final     RDW-SD   Date Value Ref Range Status   07/31/2019 43.0 37.0 - 54.0 fl Final     MPV   Date Value Ref Range Status   07/31/2019 9.4 6.0 - 12.0 fL Final     Platelets   Date Value Ref Range Status   07/31/2019 114 (L) 140 - 450 10*3/mm3 Final     Neutrophil %   Date Value Ref Range Status   07/31/2019 71.5 42.7 - 76.0 % Final     Lymphocyte %   Date Value Ref Range Status   07/31/2019 14.3 (L) 19.6 - 45.3 % Final     Monocyte %   Date Value Ref Range Status   07/31/2019 9.5 5.0 - 12.0 % Final     Eosinophil %   Date Value Ref Range Status   07/31/2019 4.5 0.3 - 6.2 % Final     Basophil %   Date Value Ref Range Status   07/31/2019 0.2 0.0 - 1.5 % Final     Neutrophils, Absolute   Date Value Ref Range Status   07/31/2019 4.01 1.70 - 7.00 10*3/mm3 Final     Lymphocytes, Absolute   Date Value Ref Range Status   07/31/2019 0.80 0.70 - 3.10  10*3/mm3 Final     Monocytes, Absolute   Date Value Ref Range Status   07/31/2019 0.53 0.10 - 0.90 10*3/mm3 Final     Eosinophils, Absolute   Date Value Ref Range Status   07/31/2019 0.25 0.00 - 0.40 10*3/mm3 Final     Basophils, Absolute   Date Value Ref Range Status   07/31/2019 0.01 0.00 - 0.20 10*3/mm3 Final       Lab Results   Component Value Date    GLUCOSE 102 (H) 05/19/2017    BUN 17 05/19/2017    CREATININE 0.95 05/19/2017    EGFRIFNONA 98 05/19/2017    EGFRIFAFRI 85 05/19/2017    BCR NOT APPLICABLE (calc) 05/19/2017    K 4.4 05/19/2017    CO2 24 04/25/2017    CALCIUM 10.1 05/19/2017    ALBUMIN 4.8 05/19/2017    LABIL2 1.6 (calc) 05/19/2017    AST 21 05/19/2017    ALT 19 05/19/2017         Assessment/Plan     Thrombocytopenia (CMS/LTAC, located within [facility])  - CBC & Differential  - CBC Auto Differential  - Comprehensive Metabolic Panel  - CBC & Differential    Undifferentiated inflammatory polyarthritis (CMS/LTAC, located within [facility])      ASSESSMENT:  Patient claims to have had an ankle surgery done since last visit without any complications.  His platelet count is adequate and a purple top tube today and will be checked in the blue top next visit.  I will also repeat a CMP next visit and have made him aware of things to watch for if platelet count drops.  He is taking Plaquenil and gabapentin through Dr. Elias's office for undifferentiated inflammatory polyarthritis.      PLAN:  Check CBC in blue top tube next visit.  CMP next visit.           I have reviewed labs results, imaging, vitals, and medications with the patient today. Will follow up in 6 months with NP.      Patient verbalized understanding and is in agreement of the above plan.    I have reviewed and validated the information above.   Lupillo Sewell M.D., F.A.C.P.      Much of the above report is an electronic transcription/translation of the spoken language to printed text using Dragon Software. As such, the subtleties and finesse of the spoken language may permit erroneous, or at  times, nonsensical words or phrases to be inadvertently transcribed; thus changes may be made at a later date to rectify these errors.

## 2020-01-06 ENCOUNTER — TELEPHONE (OUTPATIENT)
Dept: ONCOLOGY | Facility: CLINIC | Age: 65
End: 2020-01-06

## 2020-01-22 ENCOUNTER — APPOINTMENT (OUTPATIENT)
Dept: LAB | Facility: HOSPITAL | Age: 65
End: 2020-01-22

## 2021-11-05 ENCOUNTER — TRANSCRIBE ORDERS (OUTPATIENT)
Dept: ADMINISTRATIVE | Facility: HOSPITAL | Age: 66
End: 2021-11-05

## 2021-11-05 ENCOUNTER — LAB (OUTPATIENT)
Dept: LAB | Facility: HOSPITAL | Age: 66
End: 2021-11-05

## 2021-11-05 ENCOUNTER — HOSPITAL ENCOUNTER (OUTPATIENT)
Dept: CARDIOLOGY | Facility: HOSPITAL | Age: 66
Discharge: HOME | End: 2021-11-05

## 2021-11-05 ENCOUNTER — HOSPITAL ENCOUNTER (OUTPATIENT)
Dept: GENERAL RADIOLOGY | Facility: HOSPITAL | Age: 66
Discharge: HOME | End: 2021-11-05

## 2021-11-05 DIAGNOSIS — Z01.818 PRE-OP TESTING: ICD-10-CM

## 2021-11-05 DIAGNOSIS — Z01.818 PRE-OP TESTING: Primary | ICD-10-CM

## 2021-11-05 LAB
ALBUMIN SERPL-MCNC: 4.4 G/DL (ref 3.5–5.2)
ANION GAP SERPL CALCULATED.3IONS-SCNC: 9.3 MMOL/L (ref 5–15)
BASOPHILS # BLD AUTO: 0.02 10*3/MM3 (ref 0–0.2)
BASOPHILS NFR BLD AUTO: 0.4 % (ref 0–1.5)
BUN SERPL-MCNC: 23 MG/DL (ref 8–23)
BUN/CREAT SERPL: 24 (ref 7–25)
CALCIUM SPEC-SCNC: 9.2 MG/DL (ref 8.6–10.5)
CHLORIDE SERPL-SCNC: 99 MMOL/L (ref 98–107)
CO2 SERPL-SCNC: 27.7 MMOL/L (ref 22–29)
CREAT SERPL-MCNC: 0.96 MG/DL (ref 0.76–1.27)
DEPRECATED RDW RBC AUTO: 43.3 FL (ref 37–54)
EOSINOPHIL # BLD AUTO: 0.17 10*3/MM3 (ref 0–0.4)
EOSINOPHIL NFR BLD AUTO: 3.6 % (ref 0.3–6.2)
ERYTHROCYTE [DISTWIDTH] IN BLOOD BY AUTOMATED COUNT: 13.5 % (ref 12.3–15.4)
GFR SERPL CREATININE-BSD FRML MDRD: 78 ML/MIN/1.73
GLUCOSE SERPL-MCNC: 95 MG/DL (ref 65–99)
HBA1C MFR BLD: 4.9 % (ref 3.5–5.6)
HCT VFR BLD AUTO: 42.7 % (ref 37.5–51)
HGB BLD-MCNC: 14.5 G/DL (ref 13–17.7)
IMM GRANULOCYTES # BLD AUTO: 0.03 10*3/MM3 (ref 0–0.05)
IMM GRANULOCYTES NFR BLD AUTO: 0.6 % (ref 0–0.5)
LYMPHOCYTES # BLD AUTO: 0.74 10*3/MM3 (ref 0.7–3.1)
LYMPHOCYTES NFR BLD AUTO: 15.5 % (ref 19.6–45.3)
MCH RBC QN AUTO: 30 PG (ref 26.6–33)
MCHC RBC AUTO-ENTMCNC: 34 G/DL (ref 31.5–35.7)
MCV RBC AUTO: 88.4 FL (ref 79–97)
MONOCYTES # BLD AUTO: 0.34 10*3/MM3 (ref 0.1–0.9)
MONOCYTES NFR BLD AUTO: 7.1 % (ref 5–12)
MRSA DNA SPEC QL NAA+PROBE: NORMAL
NEUTROPHILS NFR BLD AUTO: 3.48 10*3/MM3 (ref 1.7–7)
NEUTROPHILS NFR BLD AUTO: 72.8 % (ref 42.7–76)
NRBC BLD AUTO-RTO: 0 /100 WBC (ref 0–0.2)
PLATELET # BLD AUTO: 118 10*3/MM3 (ref 140–450)
PMV BLD AUTO: 10 FL (ref 6–12)
POTASSIUM SERPL-SCNC: 3.7 MMOL/L (ref 3.5–5.2)
RBC # BLD AUTO: 4.83 10*6/MM3 (ref 4.14–5.8)
SODIUM SERPL-SCNC: 136 MMOL/L (ref 136–145)
WBC # BLD AUTO: 4.78 10*3/MM3 (ref 3.4–10.8)

## 2021-11-05 PROCEDURE — 73562 X-RAY EXAM OF KNEE 3: CPT

## 2021-11-05 PROCEDURE — 93005 ELECTROCARDIOGRAM TRACING: CPT | Performed by: ORTHOPAEDIC SURGERY

## 2021-11-05 PROCEDURE — 36415 COLL VENOUS BLD VENIPUNCTURE: CPT

## 2021-11-05 PROCEDURE — 80048 BASIC METABOLIC PNL TOTAL CA: CPT

## 2021-11-05 PROCEDURE — 82040 ASSAY OF SERUM ALBUMIN: CPT

## 2021-11-05 PROCEDURE — 83036 HEMOGLOBIN GLYCOSYLATED A1C: CPT

## 2021-11-05 PROCEDURE — 93010 ELECTROCARDIOGRAM REPORT: CPT | Performed by: INTERNAL MEDICINE

## 2021-11-05 PROCEDURE — 85025 COMPLETE CBC W/AUTO DIFF WBC: CPT

## 2021-11-05 PROCEDURE — 87641 MR-STAPH DNA AMP PROBE: CPT

## 2021-11-07 LAB — QT INTERVAL: 416 MS

## 2022-01-03 RX ORDER — OMEPRAZOLE 20 MG/1
20 CAPSULE, DELAYED RELEASE ORAL DAILY
COMMUNITY

## 2022-01-03 RX ORDER — DOCUSATE SODIUM 250 MG
250 CAPSULE ORAL DAILY
COMMUNITY

## 2022-01-07 ENCOUNTER — HOSPITAL ENCOUNTER (OUTPATIENT)
Dept: GENERAL RADIOLOGY | Facility: HOSPITAL | Age: 67
Discharge: HOME | End: 2022-01-07

## 2022-01-07 ENCOUNTER — LAB (OUTPATIENT)
Dept: LAB | Facility: HOSPITAL | Age: 67
End: 2022-01-07

## 2022-01-07 DIAGNOSIS — Z01.818 PREOP TESTING: Primary | ICD-10-CM

## 2022-01-07 LAB
ABO GROUP BLD: NORMAL
ALBUMIN SERPL-MCNC: 4.2 G/DL (ref 3.5–5.2)
ANION GAP SERPL CALCULATED.3IONS-SCNC: 9.6 MMOL/L (ref 5–15)
APTT PPP: 24.6 SECONDS (ref 24–31)
BLD GP AB SCN SERPL QL: NEGATIVE
BUN SERPL-MCNC: 25 MG/DL (ref 8–23)
BUN/CREAT SERPL: 26 (ref 7–25)
CALCIUM SPEC-SCNC: 9.3 MG/DL (ref 8.6–10.5)
CHLORIDE SERPL-SCNC: 97 MMOL/L (ref 98–107)
CO2 SERPL-SCNC: 31.4 MMOL/L (ref 22–29)
CREAT SERPL-MCNC: 0.96 MG/DL (ref 0.76–1.27)
DEPRECATED RDW RBC AUTO: 43.8 FL (ref 37–54)
ERYTHROCYTE [DISTWIDTH] IN BLOOD BY AUTOMATED COUNT: 13.5 % (ref 12.3–15.4)
GFR SERPL CREATININE-BSD FRML MDRD: 78 ML/MIN/1.73
GLUCOSE SERPL-MCNC: 96 MG/DL (ref 65–99)
HBA1C MFR BLD: 4.9 % (ref 3.5–5.6)
HCT VFR BLD AUTO: 42.2 % (ref 37.5–51)
HGB BLD-MCNC: 14.6 G/DL (ref 13–17.7)
INR PPP: 0.97 (ref 0.93–1.1)
MCH RBC QN AUTO: 30.6 PG (ref 26.6–33)
MCHC RBC AUTO-ENTMCNC: 34.6 G/DL (ref 31.5–35.7)
MCV RBC AUTO: 88.5 FL (ref 79–97)
MRSA DNA SPEC QL NAA+PROBE: NORMAL
PLATELET # BLD AUTO: 132 10*3/MM3 (ref 140–450)
PMV BLD AUTO: 10.4 FL (ref 6–12)
POTASSIUM SERPL-SCNC: 4.2 MMOL/L (ref 3.5–5.2)
PROTHROMBIN TIME: 10.8 SECONDS (ref 9.6–11.7)
RBC # BLD AUTO: 4.77 10*6/MM3 (ref 4.14–5.8)
RH BLD: POSITIVE
SARS-COV-2 ORF1AB RESP QL NAA+PROBE: NOT DETECTED
SODIUM SERPL-SCNC: 138 MMOL/L (ref 136–145)
T&S EXPIRATION DATE: NORMAL
WBC NRBC COR # BLD: 5.37 10*3/MM3 (ref 3.4–10.8)

## 2022-01-07 PROCEDURE — 86901 BLOOD TYPING SEROLOGIC RH(D): CPT | Performed by: ORTHOPAEDIC SURGERY

## 2022-01-07 PROCEDURE — 86900 BLOOD TYPING SEROLOGIC ABO: CPT

## 2022-01-07 PROCEDURE — C9803 HOPD COVID-19 SPEC COLLECT: HCPCS

## 2022-01-07 PROCEDURE — 71046 X-RAY EXAM CHEST 2 VIEWS: CPT

## 2022-01-07 PROCEDURE — 82040 ASSAY OF SERUM ALBUMIN: CPT

## 2022-01-07 PROCEDURE — 86900 BLOOD TYPING SEROLOGIC ABO: CPT | Performed by: ORTHOPAEDIC SURGERY

## 2022-01-07 PROCEDURE — 80048 BASIC METABOLIC PNL TOTAL CA: CPT | Performed by: ORTHOPAEDIC SURGERY

## 2022-01-07 PROCEDURE — 85027 COMPLETE CBC AUTOMATED: CPT | Performed by: ORTHOPAEDIC SURGERY

## 2022-01-07 PROCEDURE — U0004 COV-19 TEST NON-CDC HGH THRU: HCPCS

## 2022-01-07 PROCEDURE — 36415 COLL VENOUS BLD VENIPUNCTURE: CPT | Performed by: ORTHOPAEDIC SURGERY

## 2022-01-07 PROCEDURE — 85730 THROMBOPLASTIN TIME PARTIAL: CPT | Performed by: ORTHOPAEDIC SURGERY

## 2022-01-07 PROCEDURE — 87641 MR-STAPH DNA AMP PROBE: CPT

## 2022-01-07 PROCEDURE — 85610 PROTHROMBIN TIME: CPT | Performed by: ORTHOPAEDIC SURGERY

## 2022-01-07 PROCEDURE — 86901 BLOOD TYPING SEROLOGIC RH(D): CPT

## 2022-01-07 PROCEDURE — 83036 HEMOGLOBIN GLYCOSYLATED A1C: CPT | Performed by: ORTHOPAEDIC SURGERY

## 2022-01-07 PROCEDURE — 86850 RBC ANTIBODY SCREEN: CPT | Performed by: ORTHOPAEDIC SURGERY

## 2022-01-07 ASSESSMENT — KOOS JR
KOOS JR SCORE: 18
KOOS JR SCORE: 42.281

## 2022-01-09 ENCOUNTER — ANESTHESIA EVENT (OUTPATIENT)
Dept: PERIOP | Facility: HOSPITAL | Age: 67
End: 2022-01-09

## 2022-01-10 ENCOUNTER — ANESTHESIA (OUTPATIENT)
Dept: PERIOP | Facility: HOSPITAL | Age: 67
End: 2022-01-10

## 2022-01-10 ENCOUNTER — HOSPITAL ENCOUNTER (OUTPATIENT)
Facility: HOSPITAL | Age: 67
Setting detail: HOSPITAL OUTPATIENT SURGERY
Discharge: HOME | End: 2022-01-10
Attending: ORTHOPAEDIC SURGERY | Admitting: ORTHOPAEDIC SURGERY

## 2022-01-10 VITALS
RESPIRATION RATE: 18 BRPM | SYSTOLIC BLOOD PRESSURE: 132 MMHG | OXYGEN SATURATION: 98 % | HEART RATE: 59 BPM | DIASTOLIC BLOOD PRESSURE: 65 MMHG | WEIGHT: 315 LBS | BODY MASS INDEX: 38.36 KG/M2 | TEMPERATURE: 97.3 F | HEIGHT: 76 IN

## 2022-01-10 DIAGNOSIS — Z96.651 STATUS POST TOTAL KNEE REPLACEMENT, RIGHT: Primary | ICD-10-CM

## 2022-01-10 PROCEDURE — C1889 IMPLANT/INSERT DEVICE, NOC: HCPCS | Performed by: ORTHOPAEDIC SURGERY

## 2022-01-10 PROCEDURE — 25010000002 MIDAZOLAM PER 1 MG

## 2022-01-10 PROCEDURE — 25010000002 ROPIVACAINE PER 1 MG: Performed by: ORTHOPAEDIC SURGERY

## 2022-01-10 PROCEDURE — 25010000002 KETOROLAC TROMETHAMINE PER 15 MG: Performed by: ORTHOPAEDIC SURGERY

## 2022-01-10 PROCEDURE — 25010000002 DEXAMETHASONE SODIUM PHOSPHATE 20 MG/5ML SOLUTION 5 ML VIAL: Performed by: ORTHOPAEDIC SURGERY

## 2022-01-10 PROCEDURE — 25010000002 TRIAMCINOLONE PER 10 MG: Performed by: ORTHOPAEDIC SURGERY

## 2022-01-10 PROCEDURE — 97161 PT EVAL LOW COMPLEX 20 MIN: CPT

## 2022-01-10 PROCEDURE — C1713 ANCHOR/SCREW BN/BN,TIS/BN: HCPCS | Performed by: ORTHOPAEDIC SURGERY

## 2022-01-10 PROCEDURE — 25010000002 PROPOFOL 10 MG/ML EMULSION

## 2022-01-10 PROCEDURE — 25010000002 CEFAZOLIN PER 500 MG: Performed by: ORTHOPAEDIC SURGERY

## 2022-01-10 PROCEDURE — 25010000002 EPINEPHRINE PER 0.1 MG: Performed by: ORTHOPAEDIC SURGERY

## 2022-01-10 PROCEDURE — C1776 JOINT DEVICE (IMPLANTABLE): HCPCS | Performed by: ORTHOPAEDIC SURGERY

## 2022-01-10 DEVICE — DEV WND/CLS CONTRL TISS STRATAFIX SYMM PDS PLS CTX 60CM VIL: Type: IMPLANTABLE DEVICE | Site: KNEE | Status: FUNCTIONAL

## 2022-01-10 DEVICE — CMT BONE PALACOS R HI/VISC 1X40: Type: IMPLANTABLE DEVICE | Site: KNEE | Status: FUNCTIONAL

## 2022-01-10 DEVICE — IMPLANTABLE DEVICE: Type: IMPLANTABLE DEVICE | Site: KNEE | Status: FUNCTIONAL

## 2022-01-10 DEVICE — GENFLEX2 CR FEMUR, SIZE 6, RIGHT
Type: IMPLANTABLE DEVICE | Site: KNEE | Status: FUNCTIONAL
Brand: GENFLEX2 TOTAL KNEE SYSTEM

## 2022-01-10 DEVICE — IMPLANTABLE DEVICE
Type: IMPLANTABLE DEVICE | Site: KNEE | Status: FUNCTIONAL
Brand: PROVEN GEN-FLEX MODULAR/REVISION KNEE SYSTEM

## 2022-01-10 DEVICE — GENFLEX2 CR ULTRA-CONGRUENT TIBIAL INSERT, EXP, SIZE 6-10
Type: IMPLANTABLE DEVICE | Site: KNEE | Status: FUNCTIONAL
Brand: GENFLEX2 TOTAL KNEE SYSTEM

## 2022-01-10 RX ORDER — ACETAMINOPHEN 500 MG
1000 TABLET ORAL ONCE
Status: COMPLETED | OUTPATIENT
Start: 2022-01-10 | End: 2022-01-10

## 2022-01-10 RX ORDER — TRANEXAMIC ACID 10 MG/ML
1000 INJECTION, SOLUTION INTRAVENOUS ONCE
Status: COMPLETED | OUTPATIENT
Start: 2022-01-10 | End: 2022-01-10

## 2022-01-10 RX ORDER — TRAMADOL HYDROCHLORIDE 50 MG/1
50 TABLET ORAL EVERY 6 HOURS PRN
Qty: 42 TABLET | Refills: 0 | Status: SHIPPED | OUTPATIENT
Start: 2022-01-10

## 2022-01-10 RX ORDER — LIDOCAINE HYDROCHLORIDE 10 MG/ML
0.5 INJECTION, SOLUTION INFILTRATION; PERINEURAL ONCE AS NEEDED
Status: DISCONTINUED | OUTPATIENT
Start: 2022-01-10 | End: 2022-01-10 | Stop reason: HOSPADM

## 2022-01-10 RX ORDER — FENTANYL CITRATE 50 UG/ML
25 INJECTION, SOLUTION INTRAMUSCULAR; INTRAVENOUS
Status: DISCONTINUED | OUTPATIENT
Start: 2022-01-10 | End: 2022-01-10 | Stop reason: HOSPADM

## 2022-01-10 RX ORDER — ONDANSETRON 2 MG/ML
4 INJECTION INTRAMUSCULAR; INTRAVENOUS EVERY 6 HOURS PRN
Status: CANCELLED | OUTPATIENT
Start: 2022-01-10

## 2022-01-10 RX ORDER — CEPHALEXIN 500 MG/1
500 CAPSULE ORAL 2 TIMES DAILY
Qty: 28 CAPSULE | Refills: 0 | Status: SHIPPED | OUTPATIENT
Start: 2022-01-10 | End: 2022-01-24

## 2022-01-10 RX ORDER — OXYCODONE HYDROCHLORIDE 5 MG/1
15 TABLET ORAL EVERY 4 HOURS PRN
Status: DISCONTINUED | OUTPATIENT
Start: 2022-01-10 | End: 2022-01-10 | Stop reason: HOSPADM

## 2022-01-10 RX ORDER — PREDNISONE 2.5 MG
5 TABLET ORAL DAILY
Qty: 42 TABLET | Refills: 0 | Status: SHIPPED | OUTPATIENT
Start: 2022-01-10 | End: 2022-01-31

## 2022-01-10 RX ORDER — PROMETHAZINE HYDROCHLORIDE 25 MG/1
25 SUPPOSITORY RECTAL ONCE AS NEEDED
Status: DISCONTINUED | OUTPATIENT
Start: 2022-01-10 | End: 2022-01-10 | Stop reason: HOSPADM

## 2022-01-10 RX ORDER — ONDANSETRON 4 MG/1
4 TABLET, FILM COATED ORAL EVERY 6 HOURS PRN
Status: CANCELLED | OUTPATIENT
Start: 2022-01-10

## 2022-01-10 RX ORDER — FENTANYL CITRATE 50 UG/ML
50 INJECTION, SOLUTION INTRAMUSCULAR; INTRAVENOUS
Status: DISCONTINUED | OUTPATIENT
Start: 2022-01-10 | End: 2022-01-10 | Stop reason: HOSPADM

## 2022-01-10 RX ORDER — ACETAMINOPHEN 325 MG/1
650 TABLET ORAL ONCE AS NEEDED
Status: DISCONTINUED | OUTPATIENT
Start: 2022-01-10 | End: 2022-01-10 | Stop reason: HOSPADM

## 2022-01-10 RX ORDER — SODIUM CHLORIDE 0.9 % (FLUSH) 0.9 %
10 SYRINGE (ML) INJECTION AS NEEDED
Status: DISCONTINUED | OUTPATIENT
Start: 2022-01-10 | End: 2022-01-10 | Stop reason: HOSPADM

## 2022-01-10 RX ORDER — OXYCODONE HYDROCHLORIDE 5 MG/1
10 TABLET ORAL ONCE AS NEEDED
Status: DISCONTINUED | OUTPATIENT
Start: 2022-01-10 | End: 2022-01-10 | Stop reason: HOSPADM

## 2022-01-10 RX ORDER — SODIUM CHLORIDE, SODIUM LACTATE, POTASSIUM CHLORIDE, CALCIUM CHLORIDE 600; 310; 30; 20 MG/100ML; MG/100ML; MG/100ML; MG/100ML
1000 INJECTION, SOLUTION INTRAVENOUS CONTINUOUS
Status: DISCONTINUED | OUTPATIENT
Start: 2022-01-10 | End: 2022-01-10 | Stop reason: HOSPADM

## 2022-01-10 RX ORDER — MIDAZOLAM HYDROCHLORIDE 1 MG/ML
INJECTION INTRAMUSCULAR; INTRAVENOUS AS NEEDED
Status: DISCONTINUED | OUTPATIENT
Start: 2022-01-10 | End: 2022-01-10 | Stop reason: SURG

## 2022-01-10 RX ORDER — FLUMAZENIL 0.1 MG/ML
0.2 INJECTION INTRAVENOUS AS NEEDED
Status: DISCONTINUED | OUTPATIENT
Start: 2022-01-10 | End: 2022-01-10 | Stop reason: HOSPADM

## 2022-01-10 RX ORDER — DROPERIDOL 2.5 MG/ML
0.62 INJECTION, SOLUTION INTRAMUSCULAR; INTRAVENOUS ONCE AS NEEDED
Status: DISCONTINUED | OUTPATIENT
Start: 2022-01-10 | End: 2022-01-10 | Stop reason: HOSPADM

## 2022-01-10 RX ORDER — NALOXONE HCL 0.4 MG/ML
0.4 VIAL (ML) INJECTION AS NEEDED
Status: DISCONTINUED | OUTPATIENT
Start: 2022-01-10 | End: 2022-01-10 | Stop reason: HOSPADM

## 2022-01-10 RX ORDER — ACETAMINOPHEN 500 MG
1000 TABLET ORAL EVERY 6 HOURS
Status: CANCELLED | OUTPATIENT
Start: 2022-01-10

## 2022-01-10 RX ORDER — LABETALOL HYDROCHLORIDE 5 MG/ML
5 INJECTION, SOLUTION INTRAVENOUS
Status: DISCONTINUED | OUTPATIENT
Start: 2022-01-10 | End: 2022-01-10 | Stop reason: HOSPADM

## 2022-01-10 RX ORDER — CEFAZOLIN SODIUM IN 0.9 % NACL 3 G/100 ML
3 INTRAVENOUS SOLUTION, PIGGYBACK (ML) INTRAVENOUS ONCE
Status: COMPLETED | OUTPATIENT
Start: 2022-01-10 | End: 2022-01-10

## 2022-01-10 RX ORDER — ASPIRIN 81 MG/1
81 TABLET ORAL 2 TIMES DAILY
Qty: 42 TABLET | Refills: 0 | Status: SHIPPED | OUTPATIENT
Start: 2022-01-10 | End: 2022-01-31

## 2022-01-10 RX ORDER — IPRATROPIUM BROMIDE AND ALBUTEROL SULFATE 2.5; .5 MG/3ML; MG/3ML
3 SOLUTION RESPIRATORY (INHALATION) ONCE AS NEEDED
Status: DISCONTINUED | OUTPATIENT
Start: 2022-01-10 | End: 2022-01-10 | Stop reason: HOSPADM

## 2022-01-10 RX ORDER — BUPIVACAINE HYDROCHLORIDE 7.5 MG/ML
INJECTION, SOLUTION EPIDURAL; RETROBULBAR
Status: COMPLETED | OUTPATIENT
Start: 2022-01-10 | End: 2022-01-10

## 2022-01-10 RX ORDER — MELOXICAM 15 MG/1
15 TABLET ORAL DAILY
Qty: 30 TABLET | Refills: 0 | Status: SHIPPED | OUTPATIENT
Start: 2022-01-10 | End: 2022-02-09

## 2022-01-10 RX ORDER — PROMETHAZINE HYDROCHLORIDE 25 MG/1
25 TABLET ORAL ONCE AS NEEDED
Status: DISCONTINUED | OUTPATIENT
Start: 2022-01-10 | End: 2022-01-10 | Stop reason: HOSPADM

## 2022-01-10 RX ORDER — MELOXICAM 15 MG/1
15 TABLET ORAL ONCE
Status: COMPLETED | OUTPATIENT
Start: 2022-01-10 | End: 2022-01-10

## 2022-01-10 RX ORDER — ACETAMINOPHEN 650 MG
TABLET, EXTENDED RELEASE ORAL AS NEEDED
Status: DISCONTINUED | OUTPATIENT
Start: 2022-01-10 | End: 2022-01-10 | Stop reason: HOSPADM

## 2022-01-10 RX ORDER — MEPERIDINE HYDROCHLORIDE 25 MG/ML
12.5 INJECTION INTRAMUSCULAR; INTRAVENOUS; SUBCUTANEOUS
Status: DISCONTINUED | OUTPATIENT
Start: 2022-01-10 | End: 2022-01-10 | Stop reason: HOSPADM

## 2022-01-10 RX ORDER — ACETAMINOPHEN 650 MG/1
650 SUPPOSITORY RECTAL ONCE AS NEEDED
Status: DISCONTINUED | OUTPATIENT
Start: 2022-01-10 | End: 2022-01-10 | Stop reason: HOSPADM

## 2022-01-10 RX ORDER — CEFAZOLIN SODIUM IN 0.9 % NACL 3 G/100 ML
3 INTRAVENOUS SOLUTION, PIGGYBACK (ML) INTRAVENOUS EVERY 8 HOURS
Status: CANCELLED | OUTPATIENT
Start: 2022-01-10 | End: 2022-01-11

## 2022-01-10 RX ORDER — METOCLOPRAMIDE HYDROCHLORIDE 5 MG/ML
10 INJECTION INTRAMUSCULAR; INTRAVENOUS ONCE AS NEEDED
Status: DISCONTINUED | OUTPATIENT
Start: 2022-01-10 | End: 2022-01-10 | Stop reason: HOSPADM

## 2022-01-10 RX ADMIN — DEXAMETHASONE SODIUM PHOSPHATE 20 MG: 4 INJECTION, SOLUTION INTRAMUSCULAR; INTRAVENOUS at 09:54

## 2022-01-10 RX ADMIN — TRANEXAMIC ACID 1000 MG: 10 INJECTION, SOLUTION INTRAVENOUS at 11:28

## 2022-01-10 RX ADMIN — MIDAZOLAM 2 MG: 1 INJECTION INTRAMUSCULAR; INTRAVENOUS at 10:15

## 2022-01-10 RX ADMIN — MELOXICAM 15 MG: 15 TABLET ORAL at 09:30

## 2022-01-10 RX ADMIN — ACETAMINOPHEN 1000 MG: 500 TABLET, FILM COATED ORAL at 09:30

## 2022-01-10 RX ADMIN — TRANEXAMIC ACID 900 MG: 10 INJECTION, SOLUTION INTRAVENOUS at 10:38

## 2022-01-10 RX ADMIN — Medication 3 G: at 10:19

## 2022-01-10 RX ADMIN — BUPIVACAINE HYDROCHLORIDE 1.6 ML: 7.5 INJECTION, SOLUTION EPIDURAL; RETROBULBAR at 10:25

## 2022-01-10 RX ADMIN — SODIUM CHLORIDE, POTASSIUM CHLORIDE, SODIUM LACTATE AND CALCIUM CHLORIDE 1000 ML: 600; 310; 30; 20 INJECTION, SOLUTION INTRAVENOUS at 09:30

## 2022-01-10 RX ADMIN — PROPOFOL 80 MCG/KG/MIN: 10 INJECTION, EMULSION INTRAVENOUS at 10:28

## 2022-01-10 NOTE — PLAN OF CARE
Problem: Adult Inpatient Plan of Care  Goal: Plan of Care Review  Outcome: Ongoing, Progressing  Flowsheets  Taken 1/10/2022 1621  Progress: improving  Plan of Care Reviewed With: patient  Outcome Summary: Pt is 67 yo male s/p right TKA by Dr Wynn on 1/10/22. Pt completes right TKA protocol ther ex x10 reps using written handout with wife present for training.  Pt able to complete transfers with SBA and ambulate with SBA.  Stair training completed on 3 steps using one handail with CGA.  Pt educated on importance of HEP, ice/elevation, pillow placement.  Pt reports strong support of wife who will be able to assist.  Plan home with wife and RW.

## 2022-01-10 NOTE — ANESTHESIA PREPROCEDURE EVALUATION
Anesthesia Evaluation     Patient summary reviewed and Nursing notes reviewed   NPO Solid Status: > 8 hours  NPO Liquid Status: > 8 hours           Airway   Mallampati: I  TM distance: >3 FB  Neck ROM: full  No difficulty expected  Dental - normal exam     Pulmonary - normal exam   (+) COPD, sleep apnea,   Cardiovascular - normal exam    (+) hypertension,       Neuro/Psych  (+) psychiatric history Depression,     GI/Hepatic/Renal/Endo    (+) obesity,  GERD,      Musculoskeletal     Abdominal  - normal exam    Bowel sounds: normal.   Substance History - negative use     OB/GYN negative ob/gyn ROS         Other   arthritis,      ROS/Med Hx Other: ITP                  Anesthesia Plan    ASA 3     spinal   (ERAS)  intravenous induction     Anesthetic plan, all risks, benefits, and alternatives have been provided, discussed and informed consent has been obtained with: patient.    Plan discussed with CAA.

## 2022-01-10 NOTE — ANESTHESIA POSTPROCEDURE EVALUATION
Patient: Nasra Torres    Procedure Summary     Date: 01/10/22 Room / Location: [facility] OR  / [facility] MAIN OR    Anesthesia Start: 1009 Anesthesia Stop: 1203    Procedure: TOTAL KNEE ARTHROPLASTY (Right Knee) Diagnosis:       Osteoarthritis      (Osteoarthritis [M19.90])    Surgeons: Charbel Wynn MD Provider: Darien Jackson MD    Anesthesia Type: spinal ASA Status: 3          Anesthesia Type: spinal    Vitals  Vitals Value Taken Time   /60 01/10/22 1300   Temp 98.4 °F (36.9 °C) 01/10/22 1300   Pulse 57 01/10/22 1301   Resp 14 01/10/22 1300   SpO2 99 % 01/10/22 1301   Vitals shown include unvalidated device data.        Post Anesthesia Care and Evaluation    Patient location during evaluation: PACU  Patient participation: complete - patient participated  Level of consciousness: awake  Pain scale: See nurse's notes for pain score.  Pain management: adequate  Airway patency: patent  Anesthetic complications: No anesthetic complications  PONV Status: none  Cardiovascular status: acceptable  Respiratory status: acceptable  Hydration status: acceptable    Comments: Patient seen and examined postoperatively; vital signs stable; SpO2 greater than or equal to 90%; cardiopulmonary status stable; nausea/vomiting adequately controlled; pain adequately controlled; no apparent anesthesia complications; patient discharged from anesthesia care when discharge criteria were met

## 2022-01-10 NOTE — DISCHARGE PLACEMENT REQUEST
"Nasra Dias (66 y.o. Male)             Date of Birth Social Security Number Address Home Phone MRN    [id number]  [id number] [place] [phone] [id number]    Y[employer] Marital Status             None        Admission Date Admission Type Admitting Provider Attending Provider Department, Room/Bed    1/10/22 Elective Charbel Wynn MD Clegg, Travis Edward, MD [facility] MAIN OR, [facility] MAIN OR/MAIN OR    Discharge Date Discharge Disposition Discharge Destination                         Attending Provider: Charbel Wynn MD    Allergies: Sulfa Antibiotics, Tetanus Antitoxin    Isolation: None   Infection: None   Code Status: Not on file   Advance Care Planning Activity    Ht: 193 cm (76\")   Wt: 150 kg (330 lb 14.6 oz)    Admission Cmt: None   Principal Problem: None                Active Insurance as of 1/10/2022     Primary Coverage     Payor Plan Insurance Group Employer/Plan Group    [facility] MEDICARE REPLACEMENT [employer] MEDICARE REPLACEMENT [id number]     Payor Plan Address Payor Plan Phone Number Payor Plan Fax Number Effective Dates    [facility] [phone]  1/1/2021 - None Entered    [address]       [place] [facility] [address]       Subscriber Name Subscriber Birth Date Member ID       NASRA DIAS 1955 [id number]                 Emergency Contacts      (Rel.) Home Phone Work Phone Mobile Phone    LARISSAJUAN MARTE (Spouse) [phone] -- [phone]              "

## 2022-01-10 NOTE — ANESTHESIA PROCEDURE NOTES
Spinal Block      Patient reassessed immediately prior to procedure    Patient location during procedure: OR  Start Time: 1/10/2022 10:17 AM  Stop Time: 1/10/2022 10:26 AM  Indication:at surgeon's request and post-op pain management  Performed By  CRNA: Venkatesh Evans CAA  Preanesthetic Checklist  Completed: patient identified, IV checked, site marked, risks and benefits discussed, surgical consent, monitors and equipment checked, pre-op evaluation and timeout performed  Spinal Block Prep:  Patient Position:sitting  Sterile Tech:cap, gloves, mask and sterile barriers  Prep:Betadine  Patient Monitoring:blood pressure monitoring, continuous pulse oximetry and EKG  Spinal Block Procedure  Approach:midline  Guidance:palpation technique  Location:L3-L4  Needle Type:Sprotte  Needle Gauge:25 G  Placement of Spinal needle event:cerebrospinal fluid aspirated  Paresthesia: no  Fluid Appearance:clear  Medications: bupivacaine PF (MARCAINE) injection 0.75%, 1.6 mL  Med Administered at 1/10/2022 10:25 AM   Post Assessment  Patient Tolerance:patient tolerated the procedure well with no apparent complications  Complications no

## 2022-01-10 NOTE — THERAPY EVALUATION
Patient Name: Nasra Torres  : 1955    MRN: [id number]                              Today's Date: 1/10/2022       Admit Date: 1/10/2022    Visit Dx:     ICD-10-CM ICD-9-CM   1. Status post total knee replacement, right  Z96.651 V43.65     Patient Active Problem List   Diagnosis   • Undifferentiated inflammatory polyarthritis (HCC)   • Vitamin D deficiency   • Inflammatory arthritis   • Lower back pain   • Pain in joint of left shoulder   • Hypothyroidism   • Thrombocytopenia/platelet clumping    • CARMEL positive   • Sjogren's syndrome (HCC)     Past Medical History:   Diagnosis Date   • Anxiety    • Anxiety and depression    • Arthritis    • Borderline diabetes    • Cataract     left eye   • COPD (chronic obstructive pulmonary disease) ([facility])     environmental   • Depression    • DJD (degenerative joint disease)    • Dry eyes    • GERD (gastroesophageal reflux disease)    • Hyperlipidemia    • Hypertension    • Hypothyroidism    • Idiopathic thrombocytopenic purpura (ITP) ([facility])    • Sleep apnea     bipap   • Urinary frequency      Past Surgical History:   Procedure Laterality Date   • ANKLE TENDON REPAIR Left    • CATARACT EXTRACTION WITH INTRAOCULAR LENS IMPLANT Right    • CHOLECYSTECTOMY      and post hernia repair   • COLONOSCOPY     • HERNIA REPAIR      ventral hernia - age 3   • HERNIA REPAIR Bilateral     inguinal - reopen later for E - Coli      General Information     Row Name 01/10/22 1614          Physical Therapy Time and Intention    Document Type evaluation  -HC     Mode of Treatment physical therapy  -HC     Row Name 01/10/22 1614          General Information    Patient Profile Reviewed yes  -HC     Prior Level of Function independent:  -HC     Row Name 01/10/22 1614          Living Environment    Lives With spouse  -HC     Row Name 01/10/22 1614          Home Main Entrance    Number of Stairs, Main Entrance two  -HC     Row Name 01/10/22 1614          Cognition    Orientation Status (Cognition)  oriented x 4  -     Row Name 01/10/22 1614          Safety Issues, Functional Mobility    Impairments Affecting Function (Mobility) range of motion (ROM); strength  -           User Key  (r) = Recorded By, (t) = Taken By, (c) = Cosigned By    Initials Name Provider Type     Berna Pablo, PT Physical Therapist               Mobility     Row Name 01/10/22 1615          Bed Mobility    Bed Mobility supine-sit  -     Supine-Sit Mora (Bed Mobility) independent  -     Row Name 01/10/22 1615          Sit-Stand Transfer    Sit-Stand Mora (Transfers) supervision  -     Assistive Device (Sit-Stand Transfers) walker, front-wheeled  -     Row Name 01/10/22 1615          Gait/Stairs (Locomotion)    Mora Level (Gait) standby assist  -     Assistive Device (Gait) walker, front-wheeled  -     Distance in Feet (Gait) 100 ft  -HC     Deviations/Abnormal Patterns (Gait) gait speed decreased  -     Mora Level (Stairs) contact guard  -     Handrail Location (Stairs) left side (ascending)  -     Number of Steps (Stairs) 3  -HC     Ascending Technique (Stairs) step-to-step  -     Descending Technique (Stairs) step-to-step  -     Row Name 01/10/22 1615          Mobility    Extremity Weight-bearing Status right lower extremity  -     Right Lower Extremity (Weight-bearing Status) weight-bearing as tolerated (WBAT)  -           User Key  (r) = Recorded By, (t) = Taken By, (c) = Cosigned By    Initials Name Provider Type     Berna Pablo, PT Physical Therapist               Obj/Interventions     Row Name 01/10/22 1616          Range of Motion Comprehensive    Comment, General Range of Motion right knee AROM neutral knee extension to 90 degrees flexion  -     Row Name 01/10/22 1616          Strength Comprehensive (MMT)    Comment, General Manual Muscle Testing (MMT) Assessment right knee 4-/5 within available ROM  -     Row Name 01/10/22 1616          Motor Skills     Therapeutic Exercise --  Pt educated on right TKA protocol ther ex x10 reps with written handout.  Wife present for HEP  -     Row Name 01/10/22 1616          Balance    Balance Assessment sitting static balance; sitting dynamic balance; standing static balance; standing dynamic balance  -HC     Static Sitting Balance WFL  -HC     Dynamic Sitting Balance WFL  -HC     Static Standing Balance WFL  -HC     Dynamic Standing Balance WFL  -HC     Row Name 01/10/22 1616          Sensory Assessment (Somatosensory)    Sensory Assessment (Somatosensory) --  pt reports mild impaired sensation in marleny area  -           User Key  (r) = Recorded By, (t) = Taken By, (c) = Cosigned By    Initials Name Provider Type     Berna Pablo, PT Physical Therapist               Goals/Plan    No documentation.                Clinical Impression     Row Name 01/10/22 1618          Pain    Additional Documentation Pain Scale: Numbers Pre/Post-Treatment (Group)  -     Row Name 01/10/22 1618          Pain Scale: Numbers Pre/Post-Treatment    Pretreatment Pain Rating 1/10  -     Posttreatment Pain Rating 1/10  -     Row Name 01/10/22 1618          Plan of Care Review    Outcome Summary Pt is 65 yo male s/p right TKA by Dr Wynn on 1/10/22.  -     Row Name 01/10/22 1618          Therapy Assessment/Plan (PT)    Patient/Family Therapy Goals Statement (PT) return home with wife  -     Criteria for Skilled Interventions Met (PT) no problems identified which require skilled intervention  -     Row Name 01/10/22 1618          Positioning and Restraints    Pre-Treatment Position in bed  -     Post Treatment Position bed  -HC     In Bed notified nsg; call light within reach; sitting EOB  wife present  -           User Key  (r) = Recorded By, (t) = Taken By, (c) = Cosigned By    Initials Name Provider Type     Berna Pablo, PT Physical Therapist               Outcome Measures     Row Name 01/10/22 1619          How much  help from another person do you currently need...    Turning from your back to your side while in flat bed without using bedrails? 4  -HC     Moving from lying on back to sitting on the side of a flat bed without bedrails? 4  -HC     Moving to and from a bed to a chair (including a wheelchair)? 3  -HC     Standing up from a chair using your arms (e.g., wheelchair, bedside chair)? 3  -HC     Climbing 3-5 steps with a railing? 3  -HC     To walk in hospital room? 3  -HC     AM-PAC 6 Clicks Score (PT) 20  -     Row Name 01/10/22 1619          Functional Assessment    Outcome Measure Options AM-PAC 6 Clicks Basic Mobility (PT)  -           User Key  (r) = Recorded By, (t) = Taken By, (c) = Cosigned By    Initials Name Provider Type    HC Berna Pablo, PT Physical Therapist                             Physical Therapy Education                 Title: PT OT SLP Therapies (Done)     Topic: Physical Therapy (Done)     Point: Mobility training (Done)     Learning Progress Summary           Patient Acceptance, E, VU by  at 1/10/2022 1620                   Point: Home exercise program (Done)     Learning Progress Summary           Patient Acceptance, E, VU by  at 1/10/2022 1620                   Point: Precautions (Done)     Learning Progress Summary           Patient Acceptance, E, VU by  at 1/10/2022 1620                               User Key     Initials Effective Dates Name Provider Type Discipline     06/16/21 -  Berna Pablo, PT Physical Therapist PT              PT Recommendation and Plan     Plan of Care Reviewed With: patient  Progress: improving  Outcome Summary: Pt is 67 yo male s/p right TKA by Dr Wynn on 1/10/22. Pt completes right TKA protocol ther ex x10 reps using written handout with wife present for training.  Pt able to complete transfers with SBA and ambulate with SBA.  Stair training completed on 3 steps using one handail with CGA.  Pt educated on importance of HEP, ice/elevation,  pillow placement.  Pt reports strong support of wife who will be able to assist.  Plan home with wife and RW.     Time Calculation:    PT Charges     Row Name 01/10/22 1624             Time Calculation    Start Time 1350  -HC      Stop Time 1421  -HC      Time Calculation (min) 31 min  -HC      PT Received On 01/10/22  -              Time Calculation- PT    Total Timed Code Minutes- PT 0 minute(s)  -HC            User Key  (r) = Recorded By, (t) = Taken By, (c) = Cosigned By    Initials Name Provider Type     Berna Pablo, PT Physical Therapist              Therapy Charges for Today     Code Description Service Date Service Provider Modifiers Qty    [id number]  PT EVAL LOW COMPLEXITY 4 1/10/2022 Berna Pablo, PT GP 1          PT G-Codes  Outcome Measure Options: AM-PAC 6 Clicks Basic Mobility (PT)  AM-PAC 6 Clicks Score (PT): 20    Berna Pablo PT  1/10/2022

## 2022-01-10 NOTE — H&P
"   History & Physical       Patient: Nasra Torres    Proposed Surgery and date: Procedure(s) (LRB):  TOTAL KNEE ARTHROPLASTY (Right)     YOB: 1955    Medical Record Number: [id number]  Wt Readings from Last 3 Encounters:   01/03/22 (!) 152 kg (336 lb)   07/31/19 (!) 177 kg (390 lb)   07/15/19 (!) 180 kg (396 lb)     Ht Readings from Last 3 Encounters:   01/03/22 193 cm (76\")   07/31/19 193 cm (76\")   07/15/19 193 cm (76\")     Body mass index is 40.9 kg/m².  Facility age limit for growth percentiles is 20 years.      Surgeon:  Dr. Charbel Wynn M.D.        Chief Complaints: Pain    History of Present Illness: 66 y.o. male presents with right knee osteoarthritis. Onset of symptoms was years ago and has been progressively worsening despite more conservative treatment measures.  Symptoms are associated with ability to move, exercise, and perform activities of daily living.  Symptoms are aggravated by weight bearing and ROM necessary for activities of daily living.   Symptoms improve with rest, ice and elevation only minimally.      Allergies:   Allergies   Allergen Reactions   • Sulfa Antibiotics Hives and Swelling     Eye swelling   • Tetanus Antitoxin Nausea Only       Medications:   Home Medications:  No current facility-administered medications on file prior to encounter.     Current Outpatient Medications on File Prior to Encounter   Medication Sig   • albuterol sulfate HFA (PROAIR HFA) 108 (90 Base) MCG/ACT inhaler As Needed.   • ALPRAZolam (XANAX) 1 MG tablet Take 1 tablet by mouth 4 (Four) Times a Day.   • Alum Hydroxide-Mag Trisilicate 80-14.2 MG chewable tablet GAVISCON CHEW   • amLODIPine-benazepril (LOTREL) 5-40 MG per capsule Take 1 capsule by mouth Daily.   • aspirin (ASPIR-LOW) 81 MG EC tablet Take 1 tablet by mouth Daily.   • CloNIDine (CATAPRES) 0.2 MG tablet Take 1 tablet by mouth 2 (Two) Times a Day.   • clotrimazole-betamethasone (LOTRISONE) 1-0.05 % cream As Needed.   • docusate sodium " (COLACE) 250 MG capsule Take 250 mg by mouth Daily.   • fluticasone (FLONASE) 50 MCG/ACT nasal spray Daily.   • gabapentin (NEURONTIN) 300 MG capsule Take 1 capsule by mouth every night at bedtime. (Patient taking differently: Take 600 mg by mouth every night at bedtime.)   • hydrochlorothiazide (HYDRODIURIL) 25 MG tablet Take 1 tablet by mouth Daily.   • hydroxychloroquine (PLAQUENIL) 200 MG tablet Take 1 tablet by mouth 2 (Two) Times a Day.   • metoprolol tartrate (LOPRESSOR) 50 MG tablet Take 1 tablet by mouth Daily.   • omeprazole (priLOSEC) 20 MG capsule Take 20 mg by mouth Daily.   • pravastatin (PRAVACHOL) 40 MG tablet Take 1 tablet by mouth Every Night.   • sertraline (ZOLOFT) 100 MG tablet Take 1.5 tablets by mouth Every Night. 1 AND 1/2 TAB DAILY   • SYNTHROID 100 MCG tablet Take 1 tablet by mouth Daily.   • vitamin D3 125 MCG (5000 UT) capsule capsule Take 1 tablet by mouth Daily.   • cyclobenzaprine (FLEXERIL) 10 MG tablet Take 1 tablet by mouth 2 (Two) Times a Day.   • HYDROcodone-acetaminophen (NORCO)  MG per tablet As Needed.     Current Medications:  Scheduled Meds:acetaminophen, 1,000 mg, Oral, Once  ceFAZolin, 3 g, Intravenous, Once  dexamethasone (DECADRON) IVPB, 20 mg, Intravenous, Once  meloxicam, 15 mg, Oral, Once  Tranexamic Acid-NaCl, 1,000 mg, Intravenous, Once  Tranexamic Acid-NaCl, 1,000 mg, Intravenous, Once      Continuous Infusions:lactated ringers, 1,000 mL      PRN Meds:.lidocaine  •  sodium chloride    Past Medical History:   Diagnosis Date   • Anxiety    • Anxiety and depression    • Arthritis    • Borderline diabetes    • Cataract     left eye   • COPD (chronic obstructive pulmonary disease) ([facility])     environmental   • Depression    • DJD (degenerative joint disease)    • Dry eyes    • GERD (gastroesophageal reflux disease)    • Hyperlipidemia    • Hypertension    • Hypothyroidism    • Idiopathic thrombocytopenic purpura (ITP) ([facility])    • Sleep apnea     bipap   • Urinary  frequency         Past Surgical History:   Procedure Laterality Date   • ANKLE TENDON REPAIR Left    • CATARACT EXTRACTION WITH INTRAOCULAR LENS IMPLANT Right    • CHOLECYSTECTOMY      and post hernia repair   • COLONOSCOPY     • HERNIA REPAIR      ventral hernia - age 3   • HERNIA REPAIR Bilateral     inguinal - reopen later for E - Coli        Social History     Occupational History   • Not on file   Tobacco Use   • Smoking status: Never Smoker   • Smokeless tobacco: Never Used   Vaping Use   • Vaping Use: Never used   Substance and Sexual Activity   • Alcohol use: No   • Drug use: No   • Sexual activity: Defer      Social History     Social History Narrative   • Not on file        Family History   Problem Relation Age of Onset   • Heart disease Mother         CABG   • Hypertension Mother    • Heart disease Sister         LEAKY VALVE   • Heart disease Maternal Grandmother    • Hypertension Maternal Grandmother    • Other Maternal Grandmother 62        massive MI         Review of Systems: 14 point review of systems was performed and was negative except as documented in the history of present illness.      Physical Exam: 66 y.o. male    Vital signs reviewed.    General Appearance:    Alert, cooperative, in no acute distress                  General: alert, oriented  Eyes: conjunctiva clear  ENT: external ears and nose atraumatic  CV: no peripheral edema  Resp: normal respiratory effort  Skin: no rashes or wounds; normal turgor  Psych: mood and affect appropriate  Lymph: no nodes appreciated  Neuro: gross sensation intact  Vascular:  Palpable peripheral pulse in noted extremity  Musculoskeletal Extremities:  tenderness over operative extremity. Moves all extremities well, no to minimal LE edema, no cyanosis, no redness            Diagnostic Tests:  Lab Results (last 7 days)     Procedure Component Value Units Date/Time    Basic Metabolic Panel [[id number]]  (Abnormal) Collected: 01/07/22 0836    Specimen: Blood  Updated: 01/07/22 1654     Glucose 96 mg/dL      BUN 25 mg/dL      Creatinine 0.96 mg/dL      Sodium 138 mmol/L      Potassium 4.2 mmol/L      Chloride 97 mmol/L      CO2 31.4 mmol/L      Calcium 9.3 mg/dL      eGFR Non African Amer 78 mL/min/1.73      BUN/Creatinine Ratio 26.0     Anion Gap 9.6 mmol/L     Narrative:      GFR Normal >60  Chronic Kidney Disease <60  Kidney Failure <15      CBC (No Diff) [[id number]]  (Abnormal) Collected: 01/07/22 0836    Specimen: Blood Updated: 01/07/22 1622     WBC 5.37 10*3/mm3      RBC 4.77 10*6/mm3      Hemoglobin 14.6 g/dL      Hematocrit 42.2 %      MCV 88.5 fL      MCH 30.6 pg      MCHC 34.6 g/dL      RDW 13.5 %      RDW-SD 43.8 fl      MPV 10.4 fL      Platelets 132 10*3/mm3     Hemoglobin A1c [[id number]]  (Normal) Collected: 01/07/22 0836    Specimen: Blood Updated: 01/07/22 1141     Hemoglobin A1C 4.9 %     Narrative:      Hemoglobin A1C Reference Range:    <5.7 %        Normal  5.7-6.4 %     Increased risk for diabetes  > 6.4 %        Diabetes       These guidelines have been recommended by the American Diabetic Association for Hgb A1c.      The following 2010 guidelines have been recommended by the American Diabetes Association for Hemoglobin A1c.    HBA1c 5.7-6.4% Increased risk for future diabetes (pre-diabetes)  HBA1c     >6.4% Diabetes      aPTT [[id number]]  (Normal) Collected: 01/07/22 0836    Specimen: Blood Updated: 01/07/22 0947     PTT 24.6 seconds     Protime-INR [[id number]]  (Normal) Collected: 01/07/22 0836    Specimen: Blood Updated: 01/07/22 0947     Protime 10.8 Seconds      INR 0.97          Radiology images/reports reviewed      Assessment: Right knee osteoarthritis    Plan:  We will plan on proceeding with surgery at patient's request. Dr. Wynn has reviewed details of procedure with patient today and discussed risks, benefits, alternatives, and limitations of the procedure in laymen's terms with the risks including but not limited to: Allergy to  medication, allergy to implant, device recall, leg length discrepancy, dislocation,  need to stop the surgery early or change to a different procedure, stiffness requiring revision surgeries neurovascular damage, bleeding, infection, chronic pain, worsening of pain, chondrolysis, recurrent problem,  swelling, loss of motion, weakness, stiffness, instability, DVT, pulmonary embolus, death, stroke, complex regional pain syndrome, and need for additional procedures.  No guarantees were given regarding results of surgery.     Patient was given the opportunity to ask and have all questions answered today.  The patient voiced understanding of the risks, benefits, and alternative forms of treatment that were discussed and the patient consents to proceed with surgery.     Discharge Plan: Home with f/u in with Dr. Wynn  Date: 1/10/2022  Charbel Wynn MD

## 2022-01-10 NOTE — DISCHARGE INSTRUCTIONS
"Dr. Wynn Post-Op Knee Incision Care  DENEEN    You have a DENEEN wound dressing in place.  This was placed under sterile conditions in the operating room.  It remains in place until your follow-up appointment with Dr. Wynn or Osiris, his Nurse Practitioner.  The drain will operate with slight suction for 7 days.  After 7 days, it will stop working automatically.  At that time, remove the batteries from the battery pack and then discard the battery pack in the trash.  Cover the end of the tubing with plastic wrap and tape it to the dressing.  Keep it this way until your follow-up appointment.  Showering is ok after surgery.  Pause the battery pack (push the large orange button) and unscrew the battery pack from the tubing.  Place plastic wrap over the end of the tubing.  Shower, attempting to keep the dressing out of the stream of water.  When finished, gently pat the dressing and tubing dry, remove plastic wrap from end of tubing, and reattach tubing to the battery pack.  Then press the large orange button.  You will feel the battery pack vibrate until it achieves suction.  Then it will flash green over the \"ok\" button and run as usual.  "

## 2022-01-10 NOTE — OP NOTE
Name: Nasra Torres  YOB: 1955    DATE OF SURGERY: 1/10/2022    PREOPERATIVE DIAGNOSIS: Right knee end-stage osteoarthritis    POSTOPERATIVE DIAGNOSIS: Right knee end-stage osteoarthritis    PROCEDURE PERFORMED: Right total knee replacement    SURGEON: Charbel Wynn M.D.    ASSISTANT: DAIJA PISANO    IMPLANTS: Globus size 6 femur, size 5 tibia with a short 14 mm stem a size 10 ultracongruent polyethylene insert  Estimated Blood Loss: 20  Specimens : none  Complications: none    DESCRIPTION OF PROCEDURE: The patient was taken to the operating room and placed in the supine position. A sequential compression device was carefully placed on the non-operative leg. Preoperative antibiotics were administered. Surgical time out was performed. After adequate induction of anesthesia, the leg was prepped and draped in the usual sterile fashion. Tourniquet applied.  A midline incision was performed followed by a medial parapatellar arthrotomy. The patella was subluxed laterally.  A portion of the fat pad, ACL, and anterior horns of the meniscus were excised. The drill hole was placed in the distal femur and the canal was the irrigated and suctioned. The IM elaina was placed and a 5 degree distal valgus cut was performed on the femur. The femur was then sized with a sizing guide. The femoral cutting block was placed and all femoral cuts were performed. The proximal tibia was exposed. We used the extramedullary tibial cutting guide set for removal of 9mm of bone off the high side. The tibial cut was performed. The posterior horns of the menisci were excised. The posterior osteophytes were removed. Flexion extension blocks were then used to balance the knee. The tibial cut surface was then sized with the sizing templates and the tibial and femoral trial were then placed. The knee was placed in full extension and then the tibial tray rotation was then matched to the femoral rotation and marked.    Attention was  then placed to the patella. The patella was noted to track centrally through range of motion. Cautery denervation was performed. The patella tracked centrally through range of motion.   At this point all trial components were removed, the knee was copiously irrigated with pulsed lavage, and the knee was injected with anesthetic cocktail solution. The cut surfaces were then dried with clean lap sponges, and the components were cemented in place. The knee was held in full extension and all excess cement was removed. The knee was held still until the cement had completely hardened. We then placed the trial polyethylene spacer which resulted in full extension and excellent flexion-extension balance. We placed the final polyethylene spacer.   The knee was then copiously irrigated. Hemostasis was achieved.  We closed the knee in multiple layers in standard fashion. Sterile dressing were applied. At the end of the case, the sponge and needle counts were reported as being correct. There were no known complications. The patient was then transported to the recovery room.    Assistant: Emma Ramos APRN  was responsible for performing the following activities: Retraction, Suction, Irrigation, Suturing, Closing and Placing Dressing and their skilled assistance was necessary for the success of this case.        Charbel Wynn M.D.

## 2022-01-10 NOTE — DISCHARGE SUMMARY
Patient: Nasra Torres      YOB: 1955    Medical Record Number: [id number]    Attending Physician: Charbel Wynn MD  Consulting Physician(s):   Date of Admission: 1/10/2022  7:51 AM  Date of Discharge:       Patient Active Problem List   Diagnosis   • Undifferentiated inflammatory polyarthritis (HCC)   • Vitamin D deficiency   • Inflammatory arthritis   • Lower back pain   • Pain in joint of left shoulder   • Hypothyroidism   • Thrombocytopenia/platelet clumping    • CARMEL positive   • Sjogren's syndrome (HCC)     Status Post: NM TOTAL KNEE ARTHROPLASTY [57018] (TOTAL KNEE ARTHROPLASTY)      Allergies   Allergen Reactions   • Sulfa Antibiotics Hives and Swelling     Eye swelling   • Tetanus Antitoxin Nausea Only       Current Medications:     Discharge Medications      New Medications      Instructions Start Date   meloxicam 7.5 MG tablet  Commonly known as: Mobic   15 mg, Oral, Daily      predniSONE 2.5 MG tablet  Commonly known as: DELTASONE   5 mg, Oral, Daily      traMADol 50 MG tablet  Commonly known as: ULTRAM   50 mg, Oral, Every 6 Hours PRN         Changes to Medications      Instructions Start Date   aspirin 81 MG EC tablet  What changed: when to take this   81 mg, Oral, 2 Times Daily      gabapentin 300 MG capsule  Commonly known as: NEURONTIN  What changed: how much to take   300 mg, Oral, Every Night at Bedtime         Continue These Medications      Instructions Start Date   ALPRAZolam 1 MG tablet  Commonly known as: XANAX   1 tablet, Oral, 4 Times Daily      Alum Hydroxide-Mag Trisilicate 80-14.2 MG chewable tablet   GAVISCON CHEW      amLODIPine-benazepril 5-40 MG per capsule  Commonly known as: LOTREL   1 capsule, Oral, Daily      cloNIDine 0.2 MG tablet  Commonly known as: CATAPRES   1 tablet, Oral, 2 Times Daily      clotrimazole-betamethasone 1-0.05 % cream  Commonly known as: LOTRISONE   As Needed      cyclobenzaprine 10 MG tablet  Commonly known as: FLEXERIL   1 tablet, Oral,  2 Times Daily      docusate sodium 250 MG capsule  Commonly known as: COLACE   250 mg, Oral, Daily      Flonase 50 MCG/ACT nasal spray  Generic drug: fluticasone   Daily      hydroCHLOROthiazide 25 MG tablet  Commonly known as: HYDRODIURIL   1 tablet, Oral, Daily      HYDROcodone-acetaminophen  MG per tablet  Commonly known as: NORCO   As Needed      hydroxychloroquine 200 MG tablet  Commonly known as: PLAQUENIL   200 mg, Oral, 2 Times Daily      metoprolol tartrate 50 MG tablet  Commonly known as: LOPRESSOR   1 tablet, Oral, Daily      omeprazole 20 MG capsule  Commonly known as: priLOSEC   20 mg, Oral, Daily      pravastatin 40 MG tablet  Commonly known as: PRAVACHOL   1 tablet, Oral, Nightly      ProAir  (90 Base) MCG/ACT inhaler  Generic drug: albuterol sulfate HFA   As Needed      sertraline 100 MG tablet  Commonly known as: ZOLOFT   1.5 tablets, Oral, Nightly, 1 AND 1/2 TAB DAILY      Synthroid 100 MCG tablet  Generic drug: levothyroxine   1 tablet, Oral, Daily      vitamin D3 125 MCG (5000 UT) capsule capsule   1 tablet, Oral, Daily                 Past Medical History:   Diagnosis Date   • Anxiety    • Anxiety and depression    • Arthritis    • Borderline diabetes    • Cataract     left eye   • COPD (chronic obstructive pulmonary disease) ([facility])     environmental   • Depression    • DJD (degenerative joint disease)    • Dry eyes    • GERD (gastroesophageal reflux disease)    • Hyperlipidemia    • Hypertension    • Hypothyroidism    • Idiopathic thrombocytopenic purpura (ITP) ([facility])    • Sleep apnea     bipap   • Urinary frequency      Past Surgical History:   Procedure Laterality Date   • ANKLE TENDON REPAIR Left    • CATARACT EXTRACTION WITH INTRAOCULAR LENS IMPLANT Right    • CHOLECYSTECTOMY      and post hernia repair   • COLONOSCOPY     • HERNIA REPAIR      ventral hernia - age 3   • HERNIA REPAIR Bilateral     inguinal - reopen later for E - Coli     Social History     Occupational History    • Not on file   Tobacco Use   • Smoking status: Never Smoker   • Smokeless tobacco: Never Used   Vaping Use   • Vaping Use: Never used   Substance and Sexual Activity   • Alcohol use: No   • Drug use: No   • Sexual activity: Defer      Social History     Social History Narrative   • Not on file     Family History   Problem Relation Age of Onset   • Heart disease Mother         CABG   • Hypertension Mother    • Heart disease Sister         LEAKY VALVE   • Heart disease Maternal Grandmother    • Hypertension Maternal Grandmother    • Other Maternal Grandmother 62        massive MI         Physical Exam: 66 y.o. male  General Appearance:    Alert, cooperative, in no acute distress                      Vitals:    01/10/22 1215 01/10/22 1300 01/10/22 1307 01/10/22 1338   BP: 118/52 135/60 161/67 132/65   BP Location:       Patient Position:       Pulse: 60 58 60 59   Resp: 12 14 16 18   Temp:  98.4 °F (36.9 °C) 97.5 °F (36.4 °C) 97.3 °F (36.3 °C)   TempSrc:   Temporal Temporal   SpO2: 99% 98% 97% 98%   Weight:       Height:            Head:    Normocephalic, without obvious abnormality, atraumatic   Eyes:            Lids and lashes normal, conjunctivae and sclerae normal, no   icterus, no pallor, corneas clear, PERRLA   Ears:    Ears appear intact with no abnormalities noted   Throat:   No oral lesions, no thrush, oral mucosa moist   Neck:   No adenopathy, supple, trachea midline, no thyromegaly, no    carotid bruit, no JVD   Back:     No kyphosis present, no scoliosis present, no skin lesions,       erythema or scars, no tenderness to percussion or                   palpation,   range of motion normal   Lungs:     Clear to auscultation,respirations regular, even and                   unlabored    Heart:    Regular rhythm and normal rate, normal S1 and S2, no            murmur, no gallop, no rub, no click   Chest Wall:    No abnormalities observed   Abdomen:     Normal bowel sounds, no masses, no organomegaly, soft         non-tender, non-distended, no guarding, no rebound                 tenderness   Rectal:     Deferred   Extremities:   Incision intact without signs or symptoms of infection.               Neurovascular status remains intact to operative extremity.      Moves all extremities well, no edema, no cyanosis, no              redness   Pulses:   Pulses palpable and equal bilaterally   Skin:   No bleeding, bruising or rash   Lymph nodes:   No palpable adenopathy   Neurologic:   Cranial nerves 2 - 12 grossly intact, sensation intact, DTR        present and equal bilaterally           Hospital Course:  66 y.o. male admitted to [facility] to services of Charbel Wynn MD with Osteoarthritis [M19.90] on 1/10/2022 and underwent LA TOTAL KNEE ARTHROPLASTY [76262] (TOTAL KNEE ARTHROPLASTY)  Per Charbel Wynn MD. Antibiotic and VTE prophylaxis were per [facility] protocols. Post-operatively the patient transferred to the post-operative floor where the patient underwent mobilization therapy that included active as well as passive ROM exercises. Opioids were titrated to achieve appropriate pain management to allow for participation in mobilization exercises. Vital signs are now stable. The incision is intact without signs or symptoms of infection. Operative extremity neurovascular status remains intact.   Appropriate education re: incision care, activity levels, medications, and follow up visits was completed and all questions were answered. The patient is now deemed stable for discharge to Home.      DIAGNOSTIC TESTS:     Admission on 01/10/2022   Component Date Value Ref Range Status   • WBC 01/07/2022 5.37  3.40 - 10.80 10*3/mm3 Final   • RBC 01/07/2022 4.77  4.14 - 5.80 10*6/mm3 Final   • Hemoglobin 01/07/2022 14.6  13.0 - 17.7 g/dL Final   • Hematocrit 01/07/2022 42.2  37.5 - 51.0 % Final   • MCV 01/07/2022 88.5  79.0 - 97.0 fL Final   • MCH 01/07/2022 30.6  26.6 - 33.0 pg Final   • MCHC 01/07/2022 34.6  31.5 -  35.7 g/dL Final   • RDW 01/07/2022 13.5  12.3 - 15.4 % Final   • RDW-SD 01/07/2022 43.8  37.0 - 54.0 fl Final   • MPV 01/07/2022 10.4  6.0 - 12.0 fL Final   • Platelets 01/07/2022 132* 140 - 450 10*3/mm3 Final   • Glucose 01/07/2022 96  65 - 99 mg/dL Final   • BUN 01/07/2022 25* 8 - 23 mg/dL Final   • Creatinine 01/07/2022 0.96  0.76 - 1.27 mg/dL Final   • Sodium 01/07/2022 138  136 - 145 mmol/L Final   • Potassium 01/07/2022 4.2  3.5 - 5.2 mmol/L Final   • Chloride 01/07/2022 97* 98 - 107 mmol/L Final   • CO2 01/07/2022 31.4* 22.0 - 29.0 mmol/L Final   • Calcium 01/07/2022 9.3  8.6 - 10.5 mg/dL Final   • eGFR Non African Amer 01/07/2022 78  >60 mL/min/1.73 Final   • BUN/Creatinine Ratio 01/07/2022 26.0* 7.0 - 25.0 Final   • Anion Gap 01/07/2022 9.6  5.0 - 15.0 mmol/L Final   • ABO Type 01/07/2022 O   Final   • RH type 01/07/2022 Positive   Final   • Antibody Screen 01/07/2022 Negative   Final   • T&S Expiration Date 01/07/2022 1/12/2022 11:59:00 PM   Final   • Hemoglobin A1C 01/07/2022 4.9  3.5 - 5.6 % Final   • PTT 01/07/2022 24.6  24.0 - 31.0 seconds Final   • Protime 01/07/2022 10.8  9.6 - 11.7 Seconds Final   • INR 01/07/2022 0.97  0.93 - 1.10 Final       No results found.    Discharge and Follow up Instructions:     Follow up in 2 weeks    Date: 1/10/2022    ZEKE Huitron

## 2022-03-11 ENCOUNTER — TRANSCRIBE ORDERS (OUTPATIENT)
Dept: ADMINISTRATIVE | Facility: HOSPITAL | Age: 67
End: 2022-03-11

## 2022-03-11 DIAGNOSIS — Z13.6 ENCOUNTER FOR SCREENING FOR VASCULAR DISEASE: Primary | ICD-10-CM

## 2022-05-18 ENCOUNTER — APPOINTMENT (OUTPATIENT)
Dept: CT IMAGING | Facility: HOSPITAL | Age: 67
End: 2022-05-18

## 2022-05-18 ENCOUNTER — APPOINTMENT (OUTPATIENT)
Dept: CARDIOLOGY | Facility: HOSPITAL | Age: 67
End: 2022-05-18

## 2022-09-14 ENCOUNTER — HOSPITAL ENCOUNTER (OUTPATIENT)
Dept: CARDIOLOGY | Facility: HOSPITAL | Age: 67
Discharge: HOME | End: 2022-09-14

## 2022-09-14 ENCOUNTER — HOSPITAL ENCOUNTER (OUTPATIENT)
Dept: CT IMAGING | Facility: HOSPITAL | Age: 67
Discharge: HOME | End: 2022-09-14

## 2022-09-14 DIAGNOSIS — Z13.6 ENCOUNTER FOR SCREENING FOR VASCULAR DISEASE: ICD-10-CM

## 2022-09-14 LAB
BH CV XLRA MEAS - MID AO DIAM: 2.1 CM
BH CV XLRA MEAS - PAD LEFT ABI PT: 1.31
BH CV XLRA MEAS - PAD LEFT ARM: 178 MMHG
BH CV XLRA MEAS - PAD LEFT LEG PT: 239 MMHG
BH CV XLRA MEAS - PAD RIGHT ABI PT: 1.22
BH CV XLRA MEAS - PAD RIGHT ARM: 182 MMHG
BH CV XLRA MEAS - PAD RIGHT LEG PT: 222 MMHG
BH CV XLRA MEAS LEFT DIST CCA EDV: 16.2 CM/SEC
BH CV XLRA MEAS LEFT DIST CCA PSV: 83.9 CM/SEC
BH CV XLRA MEAS LEFT ICA/CCA RATIO: 1
BH CV XLRA MEAS LEFT MID CCA PSV: 84 CM/SEC
BH CV XLRA MEAS LEFT MID ICA PSV: 87 CM/SEC
BH CV XLRA MEAS LEFT PROX ICA EDV: -28.6 CM/SEC
BH CV XLRA MEAS LEFT PROX ICA PSV: -87 CM/SEC
BH CV XLRA MEAS RIGHT DIST CCA EDV: -15.5 CM/SEC
BH CV XLRA MEAS RIGHT DIST CCA PSV: -64 CM/SEC
BH CV XLRA MEAS RIGHT ICA/CCA RATIO: 1
BH CV XLRA MEAS RIGHT MID CCA PSV: 64 CM/SEC
BH CV XLRA MEAS RIGHT MID ICA PSV: 64 CM/SEC
BH CV XLRA MEAS RIGHT PROX ICA EDV: -14.9 CM/SEC
BH CV XLRA MEAS RIGHT PROX ICA PSV: -64 CM/SEC
MAXIMAL PREDICTED HEART RATE: 151 BPM
STRESS TARGET HR: 128 BPM

## 2022-09-14 PROCEDURE — 75571 CT HRT W/O DYE W/CA TEST: CPT

## 2022-09-14 PROCEDURE — 93799 UNLISTED CV SVC/PROCEDURE: CPT

## (undated) DEVICE — GLV SURG BIOGEL LTX PF 8

## (undated) DEVICE — SOL IRR NACL 0.9PCT ARTHROMATIC 3000ML

## (undated) DEVICE — UNDERGLV SURG BIOGEL INDICAT PF 8 GRN

## (undated) DEVICE — 3M™ IOBAN™ 2 ANTIMICROBIAL INCISE DRAPE 6650EZ: Brand: IOBAN™ 2

## (undated) DEVICE — KT SURG TURNOVER 050

## (undated) DEVICE — SUT VIC 2/0 CP2 CR8 18IN J762D

## (undated) DEVICE — RECIPROCATING BLADE HEAVY DUTY LONG, OFFSET  (77.6 X 0.77 X 11.2MM)

## (undated) DEVICE — PICO 7 10CM X 30CM: Brand: PICO™ 7

## (undated) DEVICE — CEMENT MIXING SYSTEM WITH FEMORAL BREAKWAY NOZZLE: Brand: REVOLUTION

## (undated) DEVICE — SLV SCD CALF HEMOFORCE DVT THERP REPROC MD

## (undated) DEVICE — MARKR SKIN W/RULR

## (undated) DEVICE — SOLUTION,WATER,IRRIGATION,1000ML,STERILE: Brand: MEDLINE

## (undated) DEVICE — ADHS SKIN PREMIERPRO EXOFIN TOPICAL HI/VISC .5ML

## (undated) DEVICE — DUAL CUT SAGITTAL BLADE

## (undated) DEVICE — GLV SURG BIOGEL SENSR LTX PF SZ8

## (undated) DEVICE — ZIP 16 SURGICAL SKIN CLOSURE DEVICE, PSA: Brand: ZIP 16 SURGICAL SKIN CLOSURE DEVICE

## (undated) DEVICE — GLV SURG SIGNATURE ESSENTIAL PF LTX SZ7

## (undated) DEVICE — PAD,ABDOMINAL,5"X9",STERILE,LF,1/PK: Brand: MEDLINE INDUSTRIES, INC.

## (undated) DEVICE — IRRIGATOR BULB ASEPTO 60CC STRL

## (undated) DEVICE — ZIP 24 SURGICAL SKIN CLOSURE DEVICE, PSA: Brand: ZIP 24 SURGICAL SKIN CLOSURE DEVICE

## (undated) DEVICE — DRSNG WND BORDR/ADHS NONADHR/GZ LF 4X10IN STRL

## (undated) DEVICE — PK TOTL KN 50

## (undated) DEVICE — WRAP KNEE COLD THERAPY

## (undated) DEVICE — DEV TOURNI/EXSNGNTN HEMACLEAR XL B/W DISP STRL

## (undated) DEVICE — SPNG GZ AVANT 6PLY 4X4IN STRL PK/2